# Patient Record
Sex: FEMALE | Race: WHITE | NOT HISPANIC OR LATINO | Employment: FULL TIME | URBAN - METROPOLITAN AREA
[De-identification: names, ages, dates, MRNs, and addresses within clinical notes are randomized per-mention and may not be internally consistent; named-entity substitution may affect disease eponyms.]

---

## 2017-01-18 ENCOUNTER — APPOINTMENT (OUTPATIENT)
Dept: LAB | Facility: HOSPITAL | Age: 52
End: 2017-01-18

## 2017-01-18 ENCOUNTER — OFFICE VISIT (OUTPATIENT)
Dept: NEUROLOGY | Facility: CLINIC | Age: 52
End: 2017-01-18

## 2017-01-18 VITALS
DIASTOLIC BLOOD PRESSURE: 82 MMHG | BODY MASS INDEX: 29.74 KG/M2 | WEIGHT: 174.2 LBS | OXYGEN SATURATION: 98 % | HEIGHT: 64 IN | HEART RATE: 78 BPM | SYSTOLIC BLOOD PRESSURE: 134 MMHG

## 2017-01-18 DIAGNOSIS — G43.C0 PERIODIC HEADACHE SYNDROME, NOT INTRACTABLE: ICD-10-CM

## 2017-01-18 DIAGNOSIS — G47.33 OSA (OBSTRUCTIVE SLEEP APNEA): Primary | ICD-10-CM

## 2017-01-18 DIAGNOSIS — R20.0 BILATERAL HAND NUMBNESS: ICD-10-CM

## 2017-01-18 LAB
FOLATE SERPL-MCNC: 6.44 NG/ML (ref 3.2–20)
HBA1C MFR BLD: 5 % (ref 4.8–5.6)
VIT B12 BLD-MCNC: 335 PG/ML (ref 211–911)

## 2017-01-18 PROCEDURE — 86800 THYROGLOBULIN ANTIBODY: CPT | Performed by: PSYCHIATRY & NEUROLOGY

## 2017-01-18 PROCEDURE — 99214 OFFICE O/P EST MOD 30 MIN: CPT | Performed by: PSYCHIATRY & NEUROLOGY

## 2017-01-18 PROCEDURE — 86617 LYME DISEASE ANTIBODY: CPT | Performed by: PSYCHIATRY & NEUROLOGY

## 2017-01-18 PROCEDURE — 86038 ANTINUCLEAR ANTIBODIES: CPT | Performed by: PSYCHIATRY & NEUROLOGY

## 2017-01-18 PROCEDURE — 82746 ASSAY OF FOLIC ACID SERUM: CPT | Performed by: PSYCHIATRY & NEUROLOGY

## 2017-01-18 PROCEDURE — 86376 MICROSOMAL ANTIBODY EACH: CPT | Performed by: PSYCHIATRY & NEUROLOGY

## 2017-01-18 PROCEDURE — 84445 ASSAY OF TSI GLOBULIN: CPT | Performed by: PSYCHIATRY & NEUROLOGY

## 2017-01-18 PROCEDURE — 83036 HEMOGLOBIN GLYCOSYLATED A1C: CPT | Performed by: PSYCHIATRY & NEUROLOGY

## 2017-01-18 PROCEDURE — 86334 IMMUNOFIX E-PHORESIS SERUM: CPT | Performed by: PSYCHIATRY & NEUROLOGY

## 2017-01-18 PROCEDURE — 36415 COLL VENOUS BLD VENIPUNCTURE: CPT | Performed by: PSYCHIATRY & NEUROLOGY

## 2017-01-18 PROCEDURE — 82607 VITAMIN B-12: CPT | Performed by: PSYCHIATRY & NEUROLOGY

## 2017-01-18 RX ORDER — VORTIOXETINE 10 MG/1
1 TABLET, FILM COATED ORAL DAILY
COMMUNITY
Start: 2016-10-25 | End: 2020-04-16 | Stop reason: ALTCHOICE

## 2017-01-18 RX ORDER — SUMATRIPTAN 100 MG/1
TABLET, FILM COATED ORAL
Qty: 12 TABLET | Refills: 11 | Status: SHIPPED | OUTPATIENT
Start: 2017-01-18 | End: 2018-02-11 | Stop reason: SDUPTHER

## 2017-01-18 RX ORDER — CLONAZEPAM 0.5 MG/1
1 TABLET ORAL DAILY PRN
COMMUNITY
Start: 2016-12-28 | End: 2020-06-15

## 2017-01-18 NOTE — MR AVS SNAPSHOT
Ginette Georges   1/18/2017 10:45 AM   Office Visit    Dept Phone:  [phone]   Encounter #:  [id number]    Provider:  Brown Cruz MD   Department:  [facility] NEUROLOGY                Your Full Care Plan              Today's Medication Changes          These changes are accurate as of: 1/18/17 11:32 AM.  If you have any questions, ask your nurse or doctor.               Stop taking medication(s)listed here:     FLUoxetine 20 MG capsule   Commonly known as:  PROzac   Stopped by:  Brown Cruz MD                      Your Updated Medication List          This list is accurate as of: 1/18/17 11:32 AM.  Always use your most recent med list.                clonazePAM 0.5 MG tablet   Commonly known as:  KlonoPIN       SUMAtriptan 100 MG tablet   Commonly known as:  IMITREX       topiramate 100 MG tablet   Commonly known as:  TOPAMAX       TRINTELLIX 10 MG tablet   Generic drug:  Vortioxetine HBr       TYLENOL ARTHRITIS EXT RELIEF PO               We Performed the Following     Antinuclear Antibody With Reflex [place]     Hemoglobin A1c     Immunofixation, Serum     Lyme Disease, Western Blot     Nerve Conduction Test     Thyroid Antibodies     Thyroid Stimulating Immunoglobulin     Vitamin B12 & Folate       You Were Diagnosed With        Codes Comments    ASHISH (obstructive sleep apnea)    -  Primary ICD-10-CM: G47.33  ICD-9-CM: 327.23     Periodic headache syndrome, not intractable     ICD-10-CM: G43.C0  ICD-9-CM: 346.20     Bilateral hand numbness     ICD-10-CM: R20.0  ICD-9-CM: 782.0       Instructions     None    Patient Instructions History      Upcoming Appointments     Visit Type Date Time Department    FOLLOW UP 1/18/2017 10:45 AM [facility] Signup     [facility] allows you to send messages to your doctor, view your test results, renew your prescriptions, schedule appointments, and more. To sign up, go to  [email or web address] and click on the Sign Up Now link in the New User? box. Enter your Locality Activation Code exactly as it appears below along with the last four digits of your Social Security Number and your Date of Birth () to complete the sign-up process. If you do not sign up before the expiration date, you must request a new code.    Locality Activation Code: [email or web address]  Expires: 2017 11:32 AM    If you have questions, you can email [email or web address] or call [phone] to talk to our Locality staff. Remember, Locality is NOT to be used for urgent needs. For medical emergencies, dial 911.               Other Info from Your Visit           Allergies     Penicillins        Reason for Visit     Migraine     Sleep Apnea           Vital Signs     Blood Pressure Pulse Height Weight Oxygen Saturation Body Mass Index    134/82 78 64\" (162.6 cm) 174 lb 3.2 oz (79 kg) 98% 29.9 kg/m2    Smoking Status                   Never Smoker           Problems and Diagnoses Noted     Bilateral hand numbness    Migraines    ASHISH (obstructive sleep apnea)      No Longer an Issue     Difficulty falling or staying asleep        "

## 2017-01-18 NOTE — LETTER
January 18, 2017     David Limon MD  [address] [place] [address] [place] [address]    Patient: Ginette Geroges   YOB: 1965   Date of Visit: 1/18/2017       Dear Dr. Braxton MD:    Thank you for referring Ginette Georges to me for evaluation. Below are the relevant portions of my assessment and plan of care.         Problems Addressed this Visit        Cardiovascular and Mediastinum    Headache, migraine    Relevant Medications    TRINTELLIX 10 MG tablet    clonazePAM (KlonoPIN) 0.5 MG tablet    Acetaminophen (TYLENOL ARTHRITIS EXT RELIEF PO)       Respiratory    ASHISH (obstructive sleep apnea) - Primary     Continue CPAP             Nervous and Auditory    Bilateral hand numbness     New onset of hand numbness, neck pain and arthralgias.    EMG/NCS of the arms  Inflammatory labs          Relevant Orders    Nerve Conduction Test    Antinuclear Antibody With Reflex [place]    Thyroid Antibodies    Thyroid Stimulating Immunoglobulin    Vitamin B12 & Folate    Immunofixation, Serum    Hemoglobin A1c    Lyme Disease, Western Blot                 If you have questions, please do not hesitate to call me. I look forward to following Ginette along with you.         Sincerely,        Brown Cruz MD        CC: No Recipients

## 2017-01-18 NOTE — PROGRESS NOTES
Subjective:     Patient ID: Ginette Georges is a 51 y.o. female.    History of Present Illness     52 yo woman with ASHISH, Insomnia and migraines returns in follow up.  Last visit on 1/5/16 continued CPAP, TPM and sumatriptan.     New sx of joint pain and fatigue starting in Sept/Oct 2016.  Increasing need to sleep.  Scheduled to Rheumatology NP in March 2017.     Notes radiating burning pain in back and hands are going numb.     Labs - ESR, CBC, CMP, SSA, dsDNA, RF - nl  TSH - 0.010  ASHISH    AHI 26.7 treated with CPAP 11 cm H20.  Uses on average 8 to 9 hours.  Replaced mask two weeks ago.  Feels rested when she awakens. Needs to nap in afternoon.     Migraines    2 to 3 a month with weather changes.  Aborts with Imitrex in 30 - 45 minutes.  Frontal pressure can be severe if not treated.  Assoc sx of nausea.   Tolerating TPM.     Insomnia    Resolved.    The following portions of the patient's history were reviewed and updated as appropriate: allergies, current medications, past medical history, past surgical history and problem list.    Review of Systems   Constitutional: Positive for fatigue.   Musculoskeletal: Positive for arthralgias and neck pain.   Neurological: Positive for numbness and headaches.   Psychiatric/Behavioral: Positive for sleep disturbance.        Objective:    Neurologic Exam     Mental Status   Oriented to person, place, and time.   Speech: speech is normal   Level of consciousness: alert  Knowledge: good and consistent with education.   Normal comprehension.     Cranial Nerves   Cranial nerves II through XII intact.     CN II   Visual fields full to confrontation.   Visual acuity: normal  Right visual field deficit: none  Left visual field deficit: none     CN III, IV, VI   Pupils are equal, round, and reactive to light.  Extraocular motions are normal.   Nystagmus: none   Diplopia: none  Ophthalmoparesis: none  Upgaze: normal  Downgaze: normal  Conjugate gaze: present    CN V   Facial  sensation intact.   Right corneal reflex: normal  Left corneal reflex: normal    CN VII   Right facial weakness: none  Left facial weakness: none    CN VIII   Hearing: intact    CN IX, X   Palate: symmetric  Right gag reflex: normal  Left gag reflex: normal    CN XI   Right sternocleidomastoid strength: normal  Left sternocleidomastoid strength: normal    CN XII   Tongue: not atrophic  Fasciculations: absent  Tongue deviation: none    Motor Exam   Muscle bulk: normal  Overall muscle tone: normal    Strength   Strength 5/5 throughout.     Sensory Exam   Right arm light touch: decreased from wrist  Left arm light touch: decreased from wrist  Right leg light touch: normal  Left leg light touch: normal  Vibration normal.   Proprioception normal.   Right arm pinprick: decreased from wrist  Left arm pinprick: decreased from wrist  Right leg pinprick: normal  Left leg pinprick: normal    Gait, Coordination, and Reflexes     Gait  Gait: normal    Tremor   Resting tremor: absent  Intention tremor: absent  Action tremor: absent    Reflexes   Reflexes 2+ except as noted.       Physical Exam   Constitutional: She is oriented to person, place, and time.   Eyes: EOM are normal. Pupils are equal, round, and reactive to light.   Neurological: She is oriented to person, place, and time. She has normal strength. Gait normal.   Psychiatric: Her speech is normal.       Assessment/Plan:       Problems Addressed this Visit        Cardiovascular and Mediastinum    Headache, migraine     Headaches are improving with treatment.  Continue current treatment regimen.             Relevant Medications    TRINTELLIX 10 MG tablet    clonazePAM (KlonoPIN) 0.5 MG tablet    Acetaminophen (TYLENOL ARTHRITIS EXT RELIEF PO)    SUMAtriptan (IMITREX) 100 MG tablet       Respiratory    ASHISH (obstructive sleep apnea) - Primary     Continue CPAP             Nervous and Auditory    Bilateral hand numbness     New onset of hand numbness, neck pain and  arthralgias.    EMG/NCS of the arms  Inflammatory labs          Relevant Orders    Nerve Conduction Test    Antinuclear Antibody With Reflex [place]    Thyroid Antibodies    Thyroid Stimulating Immunoglobulin    Vitamin B12 & Folate    Immunofixation, Serum    Hemoglobin A1c    Lyme Disease, Western Blot

## 2017-01-20 LAB
ANA SER QL: NEGATIVE
IGA SERPL-MCNC: 187 MG/DL (ref 87–352)
IGG SERPL-MCNC: 829 MG/DL (ref 700–1600)
IGM SERPL-MCNC: 144 MG/DL (ref 26–217)
Lab: NORMAL
PROT PATTERN SERPL IFE-IMP: NORMAL
THYROGLOB AB SERPL-ACNC: <1 IU/ML (ref 0–0.9)
THYROPEROXIDASE AB SERPL-ACNC: 10 IU/ML (ref 0–34)

## 2017-01-23 LAB — TSI ACT/NOR SER: 88 % (ref 0–139)

## 2017-01-24 LAB

## 2017-02-02 ENCOUNTER — OFFICE VISIT (OUTPATIENT)
Dept: NEUROLOGY | Facility: CLINIC | Age: 52
End: 2017-02-02

## 2017-02-02 VITALS
HEART RATE: 62 BPM | WEIGHT: 176.2 LBS | OXYGEN SATURATION: 98 % | HEIGHT: 64 IN | SYSTOLIC BLOOD PRESSURE: 118 MMHG | BODY MASS INDEX: 30.08 KG/M2 | DIASTOLIC BLOOD PRESSURE: 76 MMHG

## 2017-02-02 DIAGNOSIS — R20.0 BILATERAL HAND NUMBNESS: Primary | ICD-10-CM

## 2017-02-02 PROCEDURE — 95910 NRV CNDJ TEST 7-8 STUDIES: CPT | Performed by: PSYCHIATRY & NEUROLOGY

## 2017-02-02 PROCEDURE — 95886 MUSC TEST DONE W/N TEST COMP: CPT | Performed by: PSYCHIATRY & NEUROLOGY

## 2017-02-02 NOTE — PROGRESS NOTES
[facility] Neurology [place]   Electrodiagnostic Laboratory    Nerve Conduction & EMG Report        Patient:  Ginette Georges   Patient ID: [id number]   YOB: 1965  Sex:  female      Exam Physician: Brown Cruz MD     Electromyogram and Nerve Conduction Velocity Procedure Note    Hx: 51 y.o. right handed female with complaint of numbness involving the the upper extremities and pain involving the the upper extremities. Symptoms have been present for 4 months and were provoked by no clear event. Significant past medical history includes nothing suggestive of neuropathy.  Family history no family history of nerve or muscle disease.    Exam: Motor power is normal in UE. There is no atrophy. There are no fasciculations. Deep tendon reflexes are present and symmetrical. Sensory exam is normal.      Edx studies of the bilateral upper extremities were performed to evaluate for carpal tunnel syndrome and/or cervical radiculopathy    NCS Examination   For sensory nerve conduction studies, the amplitude is measured peak-to-peak, the latency reported is the distal peak latency, and the conduction velocity, if measured, is determined from onset latencies and is over the forearm.   For motor nerve conduction studies, the amplitude is measured baseline-to-peak, the latency reported is the distal onset latency, the conduction velocity is calculated over the forearm, and the F wave latency is the minimum latency.   Unless otherwise noted, the hand temperature was monitored continuously and remained between 32°C and 36°C during the performance of the NCSs.          Sensory NCS      Nerve / Sites Rec. Site Onset Lat Peak Lat NP Amp PP Amp Segments Distance Velocity     ms ms µV µV  cm m/s   L MEDIAN - Dig II Antidr      Wrist Dig II 3.10 3.85 23.4 45.2 Wrist - Dig II 14 45.2      Ref.   3.70 20.0  Ref.     R MEDIAN - Dig II Antidr      Wrist Dig II 3.30 4.10 19.1 38.5 Wrist - Dig II 14 42.4      Ref.   3.70 20.0   Ref.     L ULNAR - Dig V Antidr      Wrist Dig V 2.00 2.85 42.3 66.6 Wrist - Dig V 14 70.0      Ref.   3.70 10.0  Ref.     R ULNAR - Dig V Antidr      Wrist Dig V 2.15 3.30 26.0 65.2 Wrist - Dig V 14 65.1      Ref.   3.70 10.0  Ref.         Motor NCS      Nerve / Sites Rec. Site Lat Amp Seq Amp Segments Dist Velocity     ms mV %  cm m/s   L MEDIAN - APB      Wrist APB 3.10 4.9 100 Wrist - APB 8       Ref.  4.40 4.0  Ref.        Elbow APB 7.95 2.9 60.2 Elbow - Wrist 24 49.5      Ref.     Ref.  49.0   R MEDIAN - APB      Wrist APB 3.75 6.9 100 Wrist - APB 8       Ref.  4.40 4.0  Ref.        Elbow APB 6.95 8.4 121 Elbow - Wrist 24 75.0      Ref.     Ref.  49.0   L ULNAR - ADM      Wrist ADM 2.45 6.5 100 Wrist - ADM 8       Ref.  3.90 5.0  Ref.        B.Elbow ADM 5.95 4.9 74.7 B.Elbow - Wrist 23 65.7      Ref.     Ref.  49.0      A.Elbow ADM 7.50 5.0 103 A.Elbow - B.Elbow 9 58.1   R ULNAR - ADM      Wrist ADM 2.25 6.5 100 Wrist - ADM 8       Ref.  3.90 5.0  Ref.        B.Elbow ADM 6.00 6.3 97.9 B.Elbow - Wrist 24 64.0      Ref.     Ref.  49.0      A.Elbow ADM 8.45 4.9 77.7 A.Elbow - B.Elbow 11 44.9       F  Wave      Nerve Fmin    ms   L MEDIAN 27.25   REF 33.00   L ULNAR 27.05   REF 33.00   R MEDIAN 27.70   REF 33.00   R ULNAR 28.45   REF 33.00               EMG Examination   The study was performed with a concentric needle electrode. Fibrillation and fasciculation activity is graded from none (0) to continuous (4+). The configuration and recruitment pattern of motor unit action potentials under voluntary control, if not normal, are described bel      EMG Summary Table     Spontaneous MUAP Recruitment    IA Fib PSW Fasc H.F. Amp Dur. PPP Pattern   R. DELTOID N None None None None N N N N   R. BICEPS N None None None None N N N N   R. TRICEPS N None None None None N N N N   R. PRON TERES N None None None None N N N N   R. FIRST D INTEROSS N None None None None N N N N   L. FIRST D INTEROSS N None None None None N N N N    L. PRON TERES N None None None None N N N N   L. TRICEPS N None None None None N N N N   L. BICEPS N None None None None N N N N   L. DELTOID N None None None None N N N N         · Right median motor responses and F waves were normal.  · Right median sensory responses were normal  · Right ulnar motor responses and F wave were normal.  · Right ulnar sensory responses were normal  · Left median motor responses and F waves were normal.  · Left  median sensory responses were normal  · Left  ulnar motor responses and F wave were normal.  · Left  ulnar sensory responses were normal  · Needle examination with a concentric needle electrode of selected muscles of the bilateral upper extremities were normal         Conclusion: Normal NCV and EMG of the upper extremities          Instrument used:  Teca Synergy        Performed by:          Brown Cruz MD

## 2017-03-13 RX ORDER — TOPIRAMATE 100 MG/1
TABLET, FILM COATED ORAL
Qty: 360 TABLET | Refills: 2 | Status: SHIPPED | OUTPATIENT
Start: 2017-03-13 | End: 2017-12-06 | Stop reason: SDUPTHER

## 2017-12-06 RX ORDER — TOPIRAMATE 100 MG/1
TABLET, FILM COATED ORAL
Qty: 360 TABLET | Refills: 1 | Status: SHIPPED | OUTPATIENT
Start: 2017-12-06 | End: 2018-02-19 | Stop reason: SDUPTHER

## 2018-02-12 RX ORDER — SUMATRIPTAN 100 MG/1
TABLET, FILM COATED ORAL
Qty: 12 TABLET | Refills: 10 | Status: SHIPPED | OUTPATIENT
Start: 2018-02-12 | End: 2018-02-19 | Stop reason: SDUPTHER

## 2018-02-19 ENCOUNTER — OFFICE VISIT (OUTPATIENT)
Dept: NEUROLOGY | Facility: CLINIC | Age: 53
End: 2018-02-19

## 2018-02-19 VITALS
SYSTOLIC BLOOD PRESSURE: 114 MMHG | RESPIRATION RATE: 16 BRPM | HEIGHT: 64 IN | OXYGEN SATURATION: 100 % | HEART RATE: 76 BPM | DIASTOLIC BLOOD PRESSURE: 82 MMHG | WEIGHT: 173 LBS | BODY MASS INDEX: 29.53 KG/M2

## 2018-02-19 DIAGNOSIS — G47.33 OSA (OBSTRUCTIVE SLEEP APNEA): Primary | ICD-10-CM

## 2018-02-19 DIAGNOSIS — G43.C0 PERIODIC HEADACHE SYNDROME, NOT INTRACTABLE: ICD-10-CM

## 2018-02-19 PROCEDURE — 99213 OFFICE O/P EST LOW 20 MIN: CPT | Performed by: PSYCHIATRY & NEUROLOGY

## 2018-02-19 RX ORDER — SUMATRIPTAN 100 MG/1
TABLET, FILM COATED ORAL
Qty: 12 TABLET | Refills: 11 | Status: SHIPPED | OUTPATIENT
Start: 2018-02-19 | End: 2019-02-18 | Stop reason: SDUPTHER

## 2018-02-19 NOTE — PROGRESS NOTES
"Subjective:     Patient ID: Ginette Georges is a 52 y.o. female.  Chief Complaint   Patient presents with   • Sleep Apnea   • Bilateral hand numbness       History of Present Illness     52 y.o.  woman with ASHISH, Insomnia and migraines returns in follow up.  Last visit on 2/2/17 performed EMG/NCS of the B UE, normal, continued CPAP, TPM and sumatriptan.     New sx of joint pain and fatigue starting in Sept/Oct 2016.  Increasing need to sleep.  Scheduled to Rheumatology NP in March 2017.     Notes radiating burning pain in back and hands are going numb.     Labs - ESR, CBC, CMP, SSA, dsDNA, RF - nl  TSH - 0.010    ASHISH    Using CPAP on a daily basis.  Wearing 6 - 8 hours a day.  Needs rx for new mask and supplies.     AHI 26.7 treated with CPAP 11 cm H20.  Uses on average 8 to 9 hours.       Migraines    January 2018 had 4 HA days a week.  Increases stress at work.  Changed exercise routines and HA are decreasing.     HA aborts with Imitrex in 30 - 45 minutes, occasionally needs to repeat. .  Vertex pressure can be severe .  Assoc sx of nausea.   Tolerating TPM.       The following portions of the patient's history were reviewed and updated as appropriate: allergies, current medications, past medical history, past surgical history and problem list.    Review of Systems   Constitutional: Positive for fatigue.   Musculoskeletal: Positive for arthralgias and neck pain.   Neurological: Positive for numbness and headaches.   Psychiatric/Behavioral: Positive for sleep disturbance.        Objective:  Vitals:    02/19/18 0828   BP: 114/82   BP Location: Left arm   Patient Position: Sitting   Cuff Size: Adult   Pulse: 76   Resp: 16   SpO2: 100%   Weight: 78.5 kg (173 lb)   Height: 162.6 cm (64\")       Neurologic Exam     Mental Status   Oriented to person, place, and time.   Speech: speech is normal   Level of consciousness: alert  Knowledge: good and consistent with education.   Normal comprehension.     Cranial Nerves "   Cranial nerves II through XII intact.     CN II   Visual fields full to confrontation.   Visual acuity: normal  Right visual field deficit: none  Left visual field deficit: none     CN III, IV, VI   Pupils are equal, round, and reactive to light.  Extraocular motions are normal.   Nystagmus: none   Diplopia: none  Ophthalmoparesis: none  Upgaze: normal  Downgaze: normal  Conjugate gaze: present    CN V   Facial sensation intact.   Right corneal reflex: normal  Left corneal reflex: normal    CN VII   Right facial weakness: none  Left facial weakness: none    CN VIII   Hearing: intact    CN IX, X   Palate: symmetric  Right gag reflex: normal  Left gag reflex: normal    CN XI   Right sternocleidomastoid strength: normal  Left sternocleidomastoid strength: normal    CN XII   Tongue: not atrophic  Fasciculations: absent  Tongue deviation: none    Motor Exam   Muscle bulk: normal  Overall muscle tone: normal    Strength   Strength 5/5 throughout.     Sensory Exam   Right arm light touch: decreased from wrist  Left arm light touch: decreased from wrist  Right leg light touch: normal  Left leg light touch: normal  Vibration normal.   Proprioception normal.   Right arm pinprick: decreased from wrist  Left arm pinprick: decreased from wrist  Right leg pinprick: normal  Left leg pinprick: normal    Gait, Coordination, and Reflexes     Gait  Gait: normal    Tremor   Resting tremor: absent  Intention tremor: absent  Action tremor: absent    Reflexes   Reflexes 2+ except as noted.       Physical Exam   Constitutional: She is oriented to person, place, and time.   Eyes: EOM are normal. Pupils are equal, round, and reactive to light.   Neurological: She is oriented to person, place, and time. She has normal strength. Gait normal.   Psychiatric: Her speech is normal.       Assessment/Plan:       Problems Addressed this Visit        Cardiovascular and Mediastinum    Headache, migraine     Headaches are worsening.  Continue current  treatment regimen.             Relevant Medications    topiramate (TOPAMAX) 200 MG tablet    SUMAtriptan (IMITREX) 100 MG tablet       Respiratory    ASHISH (obstructive sleep apnea) - Primary     Moderate to severe ASHISH    Rx for new CPAP mask and supplies

## 2019-02-18 ENCOUNTER — OFFICE VISIT (OUTPATIENT)
Dept: NEUROLOGY | Facility: CLINIC | Age: 54
End: 2019-02-18

## 2019-02-18 VITALS
HEIGHT: 64 IN | HEART RATE: 78 BPM | SYSTOLIC BLOOD PRESSURE: 110 MMHG | RESPIRATION RATE: 16 BRPM | BODY MASS INDEX: 30.39 KG/M2 | DIASTOLIC BLOOD PRESSURE: 74 MMHG | WEIGHT: 178 LBS

## 2019-02-18 DIAGNOSIS — G47.33 OSA (OBSTRUCTIVE SLEEP APNEA): Primary | ICD-10-CM

## 2019-02-18 DIAGNOSIS — G43.C0 PERIODIC HEADACHE SYNDROME, NOT INTRACTABLE: ICD-10-CM

## 2019-02-18 PROCEDURE — 99213 OFFICE O/P EST LOW 20 MIN: CPT | Performed by: PSYCHIATRY & NEUROLOGY

## 2019-02-18 RX ORDER — SUMATRIPTAN 100 MG/1
100 TABLET, FILM COATED ORAL
Qty: 12 TABLET | Refills: 11 | Status: SHIPPED | OUTPATIENT
Start: 2019-02-18 | End: 2020-02-18

## 2019-02-18 NOTE — ASSESSMENT & PLAN NOTE
Headaches are worsening.  Continue current treatment regimen.     Continue Topamax 200 mg BID    Given information on Aimovig

## 2019-02-18 NOTE — PROGRESS NOTES
"Subjective:     Patient ID: Ginette Georges is a 53 y.o. female.  Chief Complaint   Patient presents with   • Sleep Apnea       History of Present Illness     53 y.o.  woman with ASHISH, Insomnia and migraines returns in follow up.  Last visit on 2/19/18  continued CPAP, TPM and sumatriptan.    ASHISH    Requesting new machine autopap 8 - 18 cm H20.      Compliant with CPAP and receiving benefit.     AHI 26.7 treated with CPAP 11 cm H20.      Migraines    Frequency is two a week.  Last for up to day.  Increased in last 60 - 90 days.      Starts in forehead and radiates to vertex pressure quality.  Severe intensity.  Can lose sight in right eye.       Preventatives:  TPM     The following portions of the patient's history were reviewed and updated as appropriate: allergies, current medications, past medical history, past surgical history and problem list.    Review of Systems   Psychiatric/Behavioral: Positive for sleep disturbance.        Objective:  Vitals:    02/18/19 0806   BP: 110/74   BP Location: Right arm   Patient Position: Sitting   Cuff Size: Adult   Pulse: 78   Resp: 16   Weight: 80.7 kg (178 lb)   Height: 162.6 cm (64\")       Neurologic Exam     Mental Status   Oriented to person, place, and time.   Speech: speech is normal   Level of consciousness: alert  Knowledge: good and consistent with education.   Normal comprehension.     Cranial Nerves   Cranial nerves II through XII intact.     CN II   Visual fields full to confrontation.   Visual acuity: normal  Right visual field deficit: none  Left visual field deficit: none     CN III, IV, VI   Pupils are equal, round, and reactive to light.  Extraocular motions are normal.   Nystagmus: none   Diplopia: none  Ophthalmoparesis: none  Upgaze: normal  Downgaze: normal  Conjugate gaze: present    CN V   Facial sensation intact.   Right corneal reflex: normal  Left corneal reflex: normal    CN VII   Right facial weakness: none  Left facial weakness: none    CN VIII "   Hearing: intact    CN IX, X   Palate: symmetric  Right gag reflex: normal  Left gag reflex: normal    CN XI   Right sternocleidomastoid strength: normal  Left sternocleidomastoid strength: normal    CN XII   Tongue: not atrophic  Fasciculations: absent  Tongue deviation: none    Motor Exam   Muscle bulk: normal  Overall muscle tone: normal    Strength   Strength 5/5 throughout.     Sensory Exam   Right arm light touch: decreased from wrist  Left arm light touch: decreased from wrist  Right leg light touch: normal  Left leg light touch: normal  Vibration normal.   Proprioception normal.   Right arm pinprick: decreased from wrist  Left arm pinprick: decreased from wrist  Right leg pinprick: normal  Left leg pinprick: normal    Gait, Coordination, and Reflexes     Gait  Gait: normal    Tremor   Resting tremor: absent  Intention tremor: absent  Action tremor: absent    Reflexes   Reflexes 2+ except as noted.       Physical Exam   Constitutional: She is oriented to person, place, and time.   Eyes: EOM are normal. Pupils are equal, round, and reactive to light.   Neurological: She is oriented to person, place, and time. She has normal strength. Gait normal.   Psychiatric: Her speech is normal.       Assessment/Plan:       Problems Addressed this Visit        Cardiovascular and Mediastinum    Headache, migraine     Headaches are worsening.  Continue current treatment regimen.     Continue Topamax 200 mg BID    Given information on Aimovig              Relevant Medications    topiramate (TOPAMAX) 200 MG tablet    SUMAtriptan (IMITREX) 100 MG tablet       Respiratory    ASHISH (obstructive sleep apnea) - Primary     Renew CPAP mask and supplies    New rx for autopap 8 - 18 cm H20

## 2020-02-28 ENCOUNTER — TELEPHONE (OUTPATIENT)
Dept: NEUROLOGY | Facility: CLINIC | Age: 55
End: 2020-02-28

## 2020-02-28 DIAGNOSIS — G43.C0 PERIODIC HEADACHE SYNDROME, NOT INTRACTABLE: Primary | ICD-10-CM

## 2020-03-02 RX ORDER — SUMATRIPTAN 100 MG/1
TABLET, FILM COATED ORAL
Qty: 12 TABLET | Refills: 5 | Status: SHIPPED | OUTPATIENT
Start: 2020-03-02 | End: 2020-04-16 | Stop reason: SDUPTHER

## 2020-04-16 ENCOUNTER — OFFICE VISIT (OUTPATIENT)
Dept: NEUROLOGY | Facility: CLINIC | Age: 55
End: 2020-04-16

## 2020-04-16 VITALS
HEART RATE: 82 BPM | DIASTOLIC BLOOD PRESSURE: 78 MMHG | BODY MASS INDEX: 31.07 KG/M2 | SYSTOLIC BLOOD PRESSURE: 127 MMHG | OXYGEN SATURATION: 98 % | HEIGHT: 64 IN | WEIGHT: 182 LBS

## 2020-04-16 DIAGNOSIS — G43.C0 PERIODIC HEADACHE SYNDROME, NOT INTRACTABLE: ICD-10-CM

## 2020-04-16 DIAGNOSIS — G47.33 OSA (OBSTRUCTIVE SLEEP APNEA): Primary | ICD-10-CM

## 2020-04-16 PROCEDURE — 99214 OFFICE O/P EST MOD 30 MIN: CPT | Performed by: PSYCHIATRY & NEUROLOGY

## 2020-04-16 RX ORDER — OMEPRAZOLE 40 MG/1
CAPSULE, DELAYED RELEASE ORAL
COMMUNITY
Start: 2020-04-12

## 2020-04-16 RX ORDER — ESTRADIOL 10 UG/1
INSERT VAGINAL
COMMUNITY
Start: 2020-04-12 | End: 2022-12-29

## 2020-04-16 RX ORDER — ESCITALOPRAM OXALATE 5 MG/1
TABLET ORAL
COMMUNITY
End: 2020-06-15 | Stop reason: DRUGHIGH

## 2020-04-16 RX ORDER — SUMATRIPTAN 100 MG/1
TABLET, FILM COATED ORAL
Qty: 12 TABLET | Refills: 11 | Status: SHIPPED | OUTPATIENT
Start: 2020-04-16 | End: 2021-07-13 | Stop reason: SDUPTHER

## 2020-04-16 RX ORDER — ALBUTEROL SULFATE 90 UG/1
AEROSOL, METERED RESPIRATORY (INHALATION)
COMMUNITY

## 2020-04-16 NOTE — PROGRESS NOTES
"Subjective:     Patient ID: Ginette Georges is a 54 y.o. female.  Chief Complaint   Patient presents with   • Obstructive Sleep Apnea     1yr follow up        History of Present Illness     54 y.o.  woman with ASHISH, Insomnia and migraines returns in follow up.  Last visit on 2/18/19 continued CPAP, TPM and sumatriptan.    ASHISH    Supplier is [employer]     Compliant with CPAP and receiving benefit.     Uses a travel machine on most nights.     AHI 7.3     AHI 26.7 treated with CPAP 11 cm H20.      Migraines    Frequency is two - three a week.  Last for up to  1-2 days. Imitrex helps but comes back the next day.        Starts in forehead and/or OD radiates to vertex pressure quality.  Severe intensity.  Can lose sight in right eye.       Preventatives:  TPM, TCAD, propranolol     The following portions of the patient's history were reviewed and updated as appropriate: allergies, current medications, past medical history, past surgical history and problem list.    Review of Systems   Psychiatric/Behavioral: Positive for sleep disturbance.        Objective:  Vitals:    04/16/20 1049   BP: 127/78   Pulse: 82   SpO2: 98%   Weight: 82.6 kg (182 lb)   Height: 162.6 cm (64\")       Neurologic Exam     Mental Status   Oriented to person, place, and time.   Speech: speech is normal   Level of consciousness: alert  Knowledge: good and consistent with education.   Normal comprehension.     Cranial Nerves   Cranial nerves II through XII intact.     CN II   Visual fields full to confrontation.   Visual acuity: normal  Right visual field deficit: none  Left visual field deficit: none     CN III, IV, VI   Pupils are equal, round, and reactive to light.  Extraocular motions are normal.   Nystagmus: none   Diplopia: none  Ophthalmoparesis: none  Upgaze: normal  Downgaze: normal  Conjugate gaze: present    CN V   Facial sensation intact.   Right corneal reflex: normal  Left corneal reflex: normal    CN VII   Right facial weakness: " none  Left facial weakness: none    CN VIII   Hearing: intact    CN IX, X   Palate: symmetric  Right gag reflex: normal  Left gag reflex: normal    CN XI   Right sternocleidomastoid strength: normal  Left sternocleidomastoid strength: normal    CN XII   Tongue: not atrophic  Fasciculations: absent  Tongue deviation: none    Motor Exam   Muscle bulk: normal  Overall muscle tone: normal    Strength   Strength 5/5 throughout.     Sensory Exam   Right arm light touch: decreased from wrist  Left arm light touch: decreased from wrist  Right leg light touch: normal  Left leg light touch: normal  Vibration normal.   Proprioception normal.   Right arm pinprick: decreased from wrist  Left arm pinprick: decreased from wrist  Right leg pinprick: normal  Left leg pinprick: normal    Gait, Coordination, and Reflexes     Gait  Gait: normal    Tremor   Resting tremor: absent  Intention tremor: absent  Action tremor: absent    Reflexes   Reflexes 2+ except as noted.       Physical Exam   Constitutional: She is oriented to person, place, and time.   Eyes: Pupils are equal, round, and reactive to light. EOM are normal.   Neurological: She is oriented to person, place, and time. She has normal strength. Gait normal.   Psychiatric: Her speech is normal.       Assessment/Plan:       Problems Addressed this Visit        Cardiovascular and Mediastinum    Headache, migraine     Headaches are worsening.  Medication changes per orders.     Add Aimovig 70 mg q4 weeks    First dosage given in office              Relevant Medications    escitalopram (Lexapro) 5 MG tablet    SUMAtriptan (IMITREX) 100 MG tablet    topiramate (TOPAMAX) 200 MG tablet       Respiratory    ASHISH (obstructive sleep apnea) - Primary     Continue CPAP

## 2020-04-16 NOTE — ASSESSMENT & PLAN NOTE
Headaches are worsening.  Medication changes per orders.     Add Aimovig 70 mg q4 weeks    First dosage given in office

## 2020-05-04 ENCOUNTER — TELEPHONE (OUTPATIENT)
Dept: NEUROLOGY | Facility: CLINIC | Age: 55
End: 2020-05-04

## 2020-05-04 NOTE — TELEPHONE ENCOUNTER
PATIENT CALLED TO REQUEST A REFILL OF HER AIMOVIG 70ML I DO NOT SEE IT ON HER MED LIST. PHARMACY  NEEDS A PRESCRIPTION SENT OVER. PT RECEIVED LETTER THAT PA WAS APPROVED    ANAHI LUCAS [facility]

## 2020-05-07 ENCOUNTER — SPECIALTY PHARMACY (OUTPATIENT)
Dept: ONCOLOGY | Facility: HOSPITAL | Age: 55
End: 2020-05-07

## 2020-06-04 ENCOUNTER — SPECIALTY PHARMACY (OUTPATIENT)
Dept: ONCOLOGY | Facility: HOSPITAL | Age: 55
End: 2020-06-04

## 2020-06-04 NOTE — PROGRESS NOTES
Injectable Neurology Medication Teaching        Patient Name/:     Ginette Georges    1965  Injectable Neurology Medication Regimen:  Aimovig 70mg SQ monthly  Date Started Medication: 2020               Initial Teaching Follow Up Comments      Safety      Storage instructions (away from children; away from heat/cold, sunlight, or moisture)       “How are you storing your medications?”, reminders on storage, proper handling (away from children, managing waste, etc.), disposal of medication with D/C or dosage change     Patient counseled on appropriate storage of medication. Store in refrigerator, away from pets and children. Advised to inspect each syringe prior to use and discard each syringe after use in an appropriate container. Pt verbalized understanding.       Adherence       patient and/or caregiver on how to take medication, take with/without food, assess their adherence potential, stress importance of adherence, ways to manage adherence (pill boxes, phone reminders, calendars), what to do if miss a dose    “How are you taking your medication?” “How are you remembering to take your medication?”, “How many doses have you missed?”, determine reasons for non-adherence (not remembering, side effects, etc), ways to improve, overadherence? Remind patient of ways to improve/maintain adherence Reviewed plan for Aimovig 70mg SQ monthly. Discussed importance of compliance. LD of Aimovig given in office on 2020. Next self-injection of Aimovig due 2020. Refill processed at [facility] retail and mailed to patient.     Side Effects/Adverse Reactions       patient on potential side effects, s/s, ways to manage, when to call MD/seek help       Determine if patient experiencing side effects, ways to manage  Discussed potential side effects including but not limited to: injection site reactions, constipation, and hypersensitivity reactions.  Counseled pt on importance of rotating injection sites.  Pt verbalized understanding.      Miscellaneous      Food interactions, DDIs, financial issues Determine if patient started any new medications (analyze for DDI) No DDIs identified with planned medication list and Aimovig.       Additional Notes: Discussed aforementioned material with patient by phone. All questions and concerns addressed. Patient provided with my contact information and instructed to call if any additional questions arise. Notified [facility] of refill request.

## 2020-06-15 ENCOUNTER — OFFICE VISIT (OUTPATIENT)
Dept: NEUROLOGY | Facility: CLINIC | Age: 55
End: 2020-06-15

## 2020-06-15 VITALS
WEIGHT: 185 LBS | OXYGEN SATURATION: 98 % | SYSTOLIC BLOOD PRESSURE: 118 MMHG | DIASTOLIC BLOOD PRESSURE: 80 MMHG | HEIGHT: 64 IN | BODY MASS INDEX: 31.58 KG/M2 | HEART RATE: 90 BPM

## 2020-06-15 DIAGNOSIS — G43.C0 PERIODIC HEADACHE SYNDROME, NOT INTRACTABLE: ICD-10-CM

## 2020-06-15 DIAGNOSIS — G47.33 OSA (OBSTRUCTIVE SLEEP APNEA): Primary | ICD-10-CM

## 2020-06-15 PROCEDURE — 99213 OFFICE O/P EST LOW 20 MIN: CPT | Performed by: PSYCHIATRY & NEUROLOGY

## 2020-06-15 RX ORDER — CHOLESTYRAMINE 4 G/5.5G
POWDER, FOR SUSPENSION ORAL
COMMUNITY
Start: 2020-06-08 | End: 2021-07-15

## 2020-06-15 RX ORDER — ESCITALOPRAM OXALATE 10 MG/1
TABLET ORAL
COMMUNITY
End: 2021-07-15

## 2020-06-15 NOTE — PROGRESS NOTES
"Subjective:     Patient ID: Ginette Georges is a 54 y.o. female.  Chief Complaint   Patient presents with   • Migraine     8 week follow up        History of Present Illness     54 y.o.  woman with ASHISH, Insomnia and migraines returns in follow up.  Last visit on 4/16/20 continued CPAP, TPM and sumatriptan, added Aimovig.    ASHISH    Supplier is [facility] [employer]    Uses nasal pillows.      Compliant with CPAP and receiving benefit.      Uses a travel machine on most nights.     AHI 7.3     AHI 26.7 treated with CPAP 11 cm H20.      Migraines    Three injections.    Frequency decreased two - three a month.  Last for up to 1 hours. Imitrex aborts in an hour.   .        Starts in forehead and/or OD radiates to vertex pressure quality.  Mild to moderate intensity.  Can lose sight in right eye.       Preventatives:  TPM, TCAD, propranolol     The following portions of the patient's history were reviewed and updated as appropriate: allergies, current medications, past medical history, past surgical history and problem list.    Review of Systems   Psychiatric/Behavioral: Positive for sleep disturbance.        Objective:  Vitals:    06/15/20 0840   BP: 118/80   Pulse: 90   SpO2: 98%   Weight: 83.9 kg (185 lb)   Height: 162.6 cm (64\")       Neurologic Exam     Mental Status   Oriented to person, place, and time.   Speech: speech is normal   Level of consciousness: alert  Knowledge: good and consistent with education.   Normal comprehension.     Cranial Nerves   Cranial nerves II through XII intact.     CN II   Visual fields full to confrontation.   Visual acuity: normal  Right visual field deficit: none  Left visual field deficit: none     CN III, IV, VI   Pupils are equal, round, and reactive to light.  Extraocular motions are normal.   Nystagmus: none   Diplopia: none  Ophthalmoparesis: none  Upgaze: normal  Downgaze: normal  Conjugate gaze: present    CN V   Facial sensation intact.   Right corneal reflex: normal  Left corneal " reflex: normal    CN VII   Right facial weakness: none  Left facial weakness: none    CN VIII   Hearing: intact    CN IX, X   Palate: symmetric  Right gag reflex: normal  Left gag reflex: normal    CN XI   Right sternocleidomastoid strength: normal  Left sternocleidomastoid strength: normal    CN XII   Tongue: not atrophic  Fasciculations: absent  Tongue deviation: none    Motor Exam   Muscle bulk: normal  Overall muscle tone: normal    Strength   Strength 5/5 throughout.     Sensory Exam   Right arm light touch: decreased from wrist  Left arm light touch: decreased from wrist  Right leg light touch: normal  Left leg light touch: normal  Vibration normal.   Proprioception normal.   Right arm pinprick: decreased from wrist  Left arm pinprick: decreased from wrist  Right leg pinprick: normal  Left leg pinprick: normal    Gait, Coordination, and Reflexes     Gait  Gait: normal    Tremor   Resting tremor: absent  Intention tremor: absent  Action tremor: absent    Reflexes   Reflexes 2+ except as noted.       Physical Exam   Constitutional: She is oriented to person, place, and time.   Eyes: Pupils are equal, round, and reactive to light. EOM are normal.   Neurological: She is oriented to person, place, and time. She has normal strength. Gait normal.   Psychiatric: Her speech is normal.       Assessment/Plan:       Problems Addressed this Visit        Cardiovascular and Mediastinum    Headache, migraine     Headaches are improving with treatment.  Continue current treatment regimen.             Relevant Medications    escitalopram (Lexapro) 10 MG tablet       Respiratory    ASHISH (obstructive sleep apnea) - Primary     Compliant with CPAP and receiving benefit.

## 2020-07-02 ENCOUNTER — SPECIALTY PHARMACY (OUTPATIENT)
Dept: ONCOLOGY | Facility: HOSPITAL | Age: 55
End: 2020-07-02

## 2020-07-02 NOTE — PROGRESS NOTES
Submitted new script for 90-day supply of Aimovig 70mg/mL SQ every 28 days per patients insurance plan.

## 2021-05-25 RX ORDER — ERENUMAB-AOOE 70 MG/ML
INJECTION SUBCUTANEOUS
Qty: 3 PEN | Refills: 3 | Status: SHIPPED | OUTPATIENT
Start: 2021-05-25 | End: 2021-07-02 | Stop reason: SDUPTHER

## 2021-07-02 ENCOUNTER — SPECIALTY PHARMACY (OUTPATIENT)
Dept: ONCOLOGY | Facility: HOSPITAL | Age: 56
End: 2021-07-02

## 2021-07-02 RX ORDER — ERENUMAB-AOOE 70 MG/ML
70 INJECTION SUBCUTANEOUS
Qty: 3 PEN | Refills: 3 | Status: SHIPPED | OUTPATIENT
Start: 2021-07-02 | End: 2021-08-05

## 2021-07-13 DIAGNOSIS — G43.C0 PERIODIC HEADACHE SYNDROME, NOT INTRACTABLE: ICD-10-CM

## 2021-07-13 RX ORDER — SUMATRIPTAN 100 MG/1
TABLET, FILM COATED ORAL
Qty: 12 TABLET | Refills: 11 | Status: SHIPPED | OUTPATIENT
Start: 2021-07-13 | End: 2022-05-05 | Stop reason: SDUPTHER

## 2021-07-13 NOTE — TELEPHONE ENCOUNTER
Sumatriptan  Follow up: 7/15/2021  Last filled:4/16/2020 with 11 refills    Patient called stating she is out of her Imitrex and has a bad headache today. She had to be rescheduled where  is out this week and will see Les this week. She is calling her specialty pharmacy next to find out why she has not received her Aimovig yet as it was sent in on 7/2/2021.

## 2021-07-15 ENCOUNTER — OFFICE VISIT (OUTPATIENT)
Dept: NEUROLOGY | Facility: CLINIC | Age: 56
End: 2021-07-15

## 2021-07-15 VITALS
DIASTOLIC BLOOD PRESSURE: 74 MMHG | HEIGHT: 64 IN | OXYGEN SATURATION: 97 % | BODY MASS INDEX: 33.12 KG/M2 | SYSTOLIC BLOOD PRESSURE: 122 MMHG | HEART RATE: 87 BPM | WEIGHT: 194 LBS

## 2021-07-15 DIAGNOSIS — G47.33 OSA (OBSTRUCTIVE SLEEP APNEA): ICD-10-CM

## 2021-07-15 DIAGNOSIS — R20.0 BILATERAL HAND NUMBNESS: ICD-10-CM

## 2021-07-15 DIAGNOSIS — G43.C0 PERIODIC HEADACHE SYNDROME, NOT INTRACTABLE: Primary | ICD-10-CM

## 2021-07-15 PROCEDURE — 99214 OFFICE O/P EST MOD 30 MIN: CPT | Performed by: NURSE PRACTITIONER

## 2021-07-15 RX ORDER — BUPROPION HYDROCHLORIDE 150 MG/1
150 TABLET ORAL DAILY
COMMUNITY
End: 2022-06-23

## 2021-07-15 RX ORDER — HYDROXYZINE PAMOATE 25 MG/1
CAPSULE ORAL AS NEEDED
COMMUNITY
Start: 2021-04-18 | End: 2022-06-23

## 2021-07-15 RX ORDER — BUPROPION HYDROCHLORIDE 300 MG/1
TABLET ORAL
COMMUNITY
Start: 2021-06-25 | End: 2021-07-15 | Stop reason: DRUGHIGH

## 2021-07-15 NOTE — PROGRESS NOTES
Subjective:     Patient ID: Ginette Georges is a 55 y.o. female.    CC:   Chief Complaint   Patient presents with   • Migraine       HPI:   History of Present IllnessPCP Dr Martin  Here to follow up HA, ASHISH, insomnia  Last visit 6/15/21 w Dr Cruz-Cont CPAP, TPM, imitrex, Aimovig, Lexapro    ASHISH  Wearing CPAP. Wearing nose pillows. Needs new rx. Cleaning tubing as directed daily. Replacing filters. Feels rested in am. Receiving benefit.    Migraines  Stopped TPM 6 weeks ago due to word finding. In meeting with clients and can't recall names.  Also with blurred vision, painful eyes. Last eye exam was 6 months ago with big change in Rx. Has been taking since 10/20/14.  Missed Aimovig dose.     Using Aimovig. Taking 12-15 imitrex/month. It is effective for only 10 days. Last injection 5/30/21.   Having 2-3  severeHA/week.  Pain in OD, loses vision, progresses to entire head. Having milder headaches 2-3 days a week.    Patient is having > 15 headaches per month, lasting 4-6 hours with moderate to severe intensity lasting over 6 months.    Preventatives: TPM, TCAD, Beta blockers, CGRP  Abortives: Sumatriptan, Tylenol, Excedrin      The following portions of the patient's history were reviewed and updated as appropriate: allergies, current medications, past family history, past medical history, past social history, past surgical history and problem list.      Current Outpatient Medications:   •  Acetaminophen (TYLENOL ARTHRITIS EXT RELIEF PO), Take 1 tablet by mouth Daily., Disp: , Rfl:   •  albuterol sulfate HFA (ProAir HFA) 108 (90 Base) MCG/ACT inhaler, ProAir HFA 90 mcg/actuation aerosol inhaler  INHALE ONE TO TWO PUFFS BY MOUTH EVERY 4 TO 6 HOURS AS NEEDED FOR SHORTNESS OF AIR, Disp: , Rfl:   •  buPROPion XL (WELLBUTRIN XL) 150 MG 24 hr tablet, Take 150 mg by mouth Daily., Disp: , Rfl:   •  Erenumab-aooe (Aimovig) 70 MG/ML prefilled syringe, Inject 1 mL under the skin into the appropriate area as directed Every  "28 (Twenty-Eight) Days., Disp: 3 pen, Rfl: 3  •  estradiol (VAGIFEM) 10 MCG tablet vaginal tablet, , Disp: , Rfl:   •  hydrOXYzine pamoate (VISTARIL) 25 MG capsule, As Needed., Disp: , Rfl:   •  linaclotide (LINZESS) 290 MCG capsule capsule, Linzess 290 mcg capsule  Take 1 capsule every day by oral route for 90 days., Disp: , Rfl:   •  omeprazole (priLOSEC) 40 MG capsule, , Disp: , Rfl:   •  SUMAtriptan (IMITREX) 100 MG tablet, Take one tablet at onset of headache. May repeat dose one time in 2 hours if headache not relieved., Disp: 12 tablet, Rfl: 11  •  topiramate (TOPAMAX) 200 MG tablet, Take 1 tablet by mouth 2 (Two) Times a Day., Disp: 180 tablet, Rfl: 2     Past Medical History:   Diagnosis Date   • Anemia    • Anxiety    • Arthritis    • Depression    • Migraine        Past Surgical History:   Procedure Laterality Date   • ANKLE ARTHROSCOPY     •  SECTION     • HYSTERECTOMY         Social History     Socioeconomic History   • Marital status:      Spouse name: Not on file   • Number of children: Not on file   • Years of education: Not on file   • Highest education level: Not on file   Tobacco Use   • Smoking status: Never Smoker   • Smokeless tobacco: Never Used   Vaping Use   • Vaping Use: Never used   Substance and Sexual Activity   • Alcohol use: Yes   • Drug use: Never   • Sexual activity: Defer       Family History   Problem Relation Age of Onset   • Alcohol abuse Father    • Hyperlipidemia Father    • Hypertension Father    • Alcohol abuse Brother    • Heart failure Maternal Grandmother    • Diabetes Maternal Grandmother    • Stroke Paternal Grandmother    • Cancer Paternal Grandmother         cervical, thyroid   • COPD Paternal Grandmother    • Arthritis Paternal Grandfather    • Down syndrome Son           Objective:  /74   Pulse 87   Ht 162.6 cm (64\")   Wt 88 kg (194 lb)   SpO2 97%   BMI 33.30 kg/m²     Neurologic Exam     Mental Status   Oriented to person, place, and time. "   Follows 3 step commands.   Attention: normal. Concentration: normal.   Speech: speech is normal   Level of consciousness: alert  Knowledge: consistent with education.   Normal comprehension.     Cranial Nerves     CN III, IV, VI   Pupils are equal, round, and reactive to light.  Right pupil: Accommodation: intact.   Left pupil: Accommodation: intact.   CN III: no CN III palsy  CN VI: no CN VI palsy  Nystagmus: none   Diplopia: none  Upgaze: normal  Downgaze: normal  Conjugate gaze: present    CN VIII   Hearing: intact    Motor Exam   Muscle bulk: normal  Overall muscle tone: normal    Strength   Right biceps: 5/5  Left biceps: 5/5  Right triceps: 5/5  Left triceps: 5/5  Right interossei: 5/5  Left interossei: 5/5  Right quadriceps: 5/5  Left quadriceps: 5/5  Right anterior tibial: 5/5  Left anterior tibial: 5/5  Right posterior tibial: 5/5  Left posterior tibial: 5/5    Sensory Exam   Light touch normal.     Gait, Coordination, and Reflexes     Gait  Gait: normal    Coordination   Romberg: negative  Finger to nose coordination: normal  Heel to shin coordination: normal    Tremor   Resting tremor: absent  Action tremor: absent    Reflexes   Right brachioradialis: 2+  Left brachioradialis: 2+  Right biceps: 2+  Left biceps: 2+  Right triceps: 2+  Left triceps: 2+  Right patellar: 2+  Left patellar: 2+  Right achilles: 2+  Left achilles: 2+  Right : 2+  Left : 2+      Physical Exam  Eyes:      Pupils: Pupils are equal, round, and reactive to light.   Neurological:      Mental Status: She is oriented to person, place, and time.      Coordination: Finger-Nose-Finger Test, Heel to Shin Test and Romberg Test normal.      Gait: Gait is intact.      Deep Tendon Reflexes:      Reflex Scores:       Tricep reflexes are 2+ on the right side and 2+ on the left side.       Bicep reflexes are 2+ on the right side and 2+ on the left side.       Brachioradialis reflexes are 2+ on the right side and 2+ on the left side.        Patellar reflexes are 2+ on the right side and 2+ on the left side.       Achilles reflexes are 2+ on the right side and 2+ on the left side.  Psychiatric:         Speech: Speech normal.            Assessment/Plan:       Diagnoses and all orders for this visit:    1. Periodic headache syndrome, not intractable (Primary)  Comments:  Pt stopped TPM. Discussed Botox and gave information but she wants to wait.  Will switch to Emgality.      2. ASHISH (obstructive sleep apnea)  Comments:  Rx written for new CPAP supplies.      3. Bilateral hand numbness  Comments:  Stop TPM.             Reviewed medications, potential side effects and signs and symptoms to report. Discussed risk versus benefits of treatment plan with patient and/or family-including medications, labs and radiology that may be ordered. Addressed questions and concerns during visit. Patient and/or family verbalized understanding and agree with plan. Instructed to call the office with any questions and report to ER with any life-threatening symptoms.    During this visit the following were done:  Labs Reviewed []    Labs Ordered []    Radiology Reports Reviewed []    Radiology Ordered []    PCP Records Reviewed []    Referring Provider Records Reviewed []    ER Records Reviewed []    Hospital Records Reviewed []    History Obtained From Family []    Radiology Images Reviewed []    Other Reviewed [x]    Records Requested []      Frankie Holman, ALIREZA, APRN  7/15/2021

## 2021-07-28 ENCOUNTER — TELEPHONE (OUTPATIENT)
Dept: NEUROLOGY | Facility: CLINIC | Age: 56
End: 2021-07-28

## 2021-07-28 NOTE — TELEPHONE ENCOUNTER
Provider: SEYMOUR SALAS    Caller: LENORA COSME    Relationship to Patient: SELF    Phone Number: [phone]    Reason for Call: THE PT CALLED IN STATING THE EMGALITY THAT WAS DISCUSSED AT HER LAST APPT WITH SEYMOUR WAS APPROVED BY HER INSURANCE. SHE IS NEEDING A RX SENT IN TO HER PHARMACY. THE PT ALSO SAID THAT HER CPAP MACHINE IS OVER 5 YEARS OLD AND HAS BEEN RECALLED. SHE CONTACTED HER DME COMPANY AND SHE SAID THEY TOLD HER TO HAVE THE OFFICE SEND IN AN ORDER FOR A NEW MACHINE ALONG WITH THE LAST OV NOTE AND SHE COULD GET A NEW MACHINE.

## 2021-08-05 ENCOUNTER — SPECIALTY PHARMACY (OUTPATIENT)
Dept: ONCOLOGY | Facility: HOSPITAL | Age: 56
End: 2021-08-05

## 2022-05-05 DIAGNOSIS — G43.C0 PERIODIC HEADACHE SYNDROME, NOT INTRACTABLE: ICD-10-CM

## 2022-05-05 RX ORDER — SUMATRIPTAN 100 MG/1
TABLET, FILM COATED ORAL
Qty: 36 TABLET | Refills: 3 | Status: SHIPPED | OUTPATIENT
Start: 2022-05-05

## 2022-05-05 NOTE — TELEPHONE ENCOUNTER
Rx Refill Note  Requested Prescriptions     Pending Prescriptions Disp Refills   • SUMAtriptan (IMITREX) 100 MG tablet 12 tablet 11     Sig: Take one tablet at onset of headache. May repeat dose one time in 2 hours if headache not relieved.      Last filled: 7/13/21 with 11 refills  Sent this in changing to 90 day supply since patient is asking for it to be filled @ mail order pharmacy. Thanks!   Last office visit with prescribing clinician: 7/15/2021      Next office visit with prescribing clinician: 6/23/2022     Safia Beck MA  05/05/22, 16:34 EDT

## 2022-05-05 NOTE — TELEPHONE ENCOUNTER
Provider: SEYMOUR SALAS   Caller: LENORA   Requested Prescriptions:   Requested Prescriptions     Pending Prescriptions Disp Refills   • SUMAtriptan (IMITREX) 100 MG tablet 12 tablet 11     Sig: Take one tablet at onset of headache. May repeat dose one time in 2 hours if headache not relieved.        Pharmacy where request should be sent: [facility] - [place], [place] - [address] AT PORTAL TO [facility] - [phone] [facility] [phone] FX     Additional details provided by patient: PT HAS ONE OR TWO TABLETS LEFT     Does the patient have less than a 3 day supply:  [x] Yes  [] No    Nancy Ledezma Rep   05/05/22 15:26 EDT         PT COMES IN ABOUT 2 WKS OUT OF THE MONTH

## 2022-06-23 ENCOUNTER — TELEMEDICINE (OUTPATIENT)
Dept: NEUROLOGY | Facility: CLINIC | Age: 57
End: 2022-06-23

## 2022-06-23 DIAGNOSIS — G43.C0 PERIODIC HEADACHE SYNDROME, NOT INTRACTABLE: Primary | ICD-10-CM

## 2022-06-23 DIAGNOSIS — G47.33 OSA (OBSTRUCTIVE SLEEP APNEA): ICD-10-CM

## 2022-06-23 PROCEDURE — 99213 OFFICE O/P EST LOW 20 MIN: CPT | Performed by: NURSE PRACTITIONER

## 2022-06-23 NOTE — PROGRESS NOTES
Headache New Office Visit      Encounter Date: 2022   Patient Name: Ginette Georges  : 1965   MRN: [id number]   PCP: Dr Martin  Chief Complaint:    Chief Complaint   Patient presents with   • Headache   • Migraine       You have chosen to receive care through a telehealth visit.  Do you consent to use a video/audio connection for your medical care today? Yes      History of Present Illness: Ginette Georges is a 56 y.o. female who is here today for evaluation of headaches and ASHISH.    Last visit 7/15/2021 w me-Stop TPM and switch to Emgality      Currently using Imitrex and Emgality    Headache Symptoms:   Days per month: 1-3/month  Location: Right Eye      Quality: Sharp and Aching        Duration: 4-6 hours  Severity: mod to severe  Triggers: weather changes  Associated Symptoms: Nausea and  Vision changes loss sight OD  Aura: none  Visual Changes: blurred    Preventatives: TPM-word finding difficulties, TCAD, Beta blockers, CGRP-Aimovig  Abortives: Sumatriptan, Tylenol, Excedrin, Imitrex       ASHISH  Wearing CPAP w nose pillows. Cleaning tubing as directed. Feels rested in am. Receiving benefit.  [facility]    AHI 7.3      AHI 26.7 treated with CPAP 11 cm H20.            Subjective      Past Medical History:   Past Medical History:   Diagnosis Date   • Anemia    • Anxiety    • Arthritis    • Depression    • Migraine        Past Surgical History:   Past Surgical History:   Procedure Laterality Date   • ANKLE ARTHROSCOPY     •  SECTION     • HYSTERECTOMY         Family History:   Family History   Problem Relation Age of Onset   • Alcohol abuse Father    • Hyperlipidemia Father    • Hypertension Father    • Alcohol abuse Brother    • Heart failure Maternal Grandmother    • Diabetes Maternal Grandmother    • Stroke Paternal Grandmother    • Cancer Paternal Grandmother         cervical, thyroid   • COPD Paternal Grandmother    • Arthritis Paternal Grandfather    • Down syndrome Son         Social History:   Social History     Socioeconomic History   • Marital status:    Tobacco Use   • Smoking status: Never Smoker   • Smokeless tobacco: Never Used   Vaping Use   • Vaping Use: Never used   Substance and Sexual Activity   • Alcohol use: Yes   • Drug use: Never   • Sexual activity: Defer       Medications:     Current Outpatient Medications:   •  galcanezumab-gnlm (EMGALITY) 120 MG/ML auto-injector pen, Inject 1 mL under the skin into the appropriate area as directed Every 28 (Twenty-Eight) Days., Disp: 3 mL, Rfl: 3  •  Acetaminophen (TYLENOL ARTHRITIS EXT RELIEF PO), Take 1 tablet by mouth Daily., Disp: , Rfl:   •  albuterol sulfate HFA (ProAir HFA) 108 (90 Base) MCG/ACT inhaler, ProAir HFA 90 mcg/actuation aerosol inhaler  INHALE ONE TO TWO PUFFS BY MOUTH EVERY 4 TO 6 HOURS AS NEEDED FOR SHORTNESS OF AIR, Disp: , Rfl:   •  estradiol (VAGIFEM) 10 MCG tablet vaginal tablet, , Disp: , Rfl:   •  linaclotide (LINZESS) 290 MCG capsule capsule, Linzess 290 mcg capsule  Take 1 capsule every day by oral route for 90 days., Disp: , Rfl:   •  omeprazole (priLOSEC) 40 MG capsule, , Disp: , Rfl:   •  SUMAtriptan (IMITREX) 100 MG tablet, Take one tablet at onset of headache. May repeat dose one time in 2 hours if headache not relieved., Disp: 36 tablet, Rfl: 3    Allergies:   Allergies   Allergen Reactions   • Malarone [Atovaquone-Proguanil Hcl] GI Intolerance   • Penicillins        PHQ-9 Total Score:     BELENADI Fall Risk Assessment has not been completed.    Objective     Physical Exam:   Physical Exam  Vitals and nursing note reviewed.   Constitutional:       General: She is not in acute distress.     Appearance: She is well-developed.   HENT:      Head: Normocephalic and atraumatic.      Right Ear: External ear normal.      Left Ear: External ear normal.      Nose: Nose normal.   Eyes:      General: No scleral icterus.        Right eye: No discharge.         Left eye: No discharge.       Conjunctiva/sclera: Conjunctivae normal.   Neurological:      General: No focal deficit present.      Mental Status: She is alert and oriented to person, place, and time. Mental status is at baseline.      Motor: No abnormal muscle tone.   Psychiatric:         Mood and Affect: Mood normal.         Behavior: Behavior normal.         Thought Content: Thought content normal.         Judgment: Judgment normal.         Neurologic Exam     Mental Status   Oriented to person, place, and time.        Vital Signs: There were no vitals filed for this visit.  There is no height or weight on file to calculate BMI.         Assessment / Plan      Assessment/Plan:   Diagnoses and all orders for this visit:    1. Periodic headache syndrome, not intractable (Primary)  Comments:  Cont Emgality and Imitrex  Orders:  -     galcanezumab-gnlm (EMGALITY) 120 MG/ML auto-injector pen; Inject 1 mL under the skin into the appropriate area as directed Every 28 (Twenty-Eight) Days.  Dispense: 3 mL; Refill: 3    2. ASHISH (obstructive sleep apnea)  Comments:  I called and requeted new order form for CPAP machine.           I called [facility]  1 [phone] and spoke to Ezekiel and she will fax over order for new machine.    Patient Education:   I have discussed with the patient today the causes and overview of headaches. We discussed the different types of headaches to include tension-type headaches, Migraine headaches and Cluster headaches. We also discussed when headaches could or would be of a more serious condition such as brain infection, inflammation or bleeding within or around the brain. When to seek immediate medical attention or call 911.     Reviewed medications, potential side effects and signs and symptoms to report. Discussed risk versus benefits of treatment plan with patient and/or family-including medications, labs and radiology that may be ordered. Addressed questions and concerns during visit. Patient and/or family verbalized  understanding and agree with plan. Instructed to call the office with any questions and report to ER with any life-threatening symptoms.     Follow Up:   Return in about 1 year (around 6/23/2023) for Recheck.    During this visit the following were done:  Labs Reviewed []    Labs Ordered []    Radiology Reports Reviewed [x]    Radiology Ordered []    PCP Records Reviewed []    Referring Provider Records Reviewed []    ER Records Reviewed []    Hospital Records Reviewed []    History Obtained From Family []    Radiology Images Reviewed []    Other Reviewed [x]    Records Requested []      Frankie Holman, DNP, APRN

## 2022-07-08 DIAGNOSIS — G43.C0 PERIODIC HEADACHE SYNDROME, NOT INTRACTABLE: ICD-10-CM

## 2022-07-08 RX ORDER — GALCANEZUMAB 120 MG/ML
INJECTION, SOLUTION SUBCUTANEOUS
Qty: 1 EACH | Refills: 2 | OUTPATIENT
Start: 2022-07-08

## 2022-07-08 NOTE — TELEPHONE ENCOUNTER
Rx Refill Note  Requested Prescriptions     Pending Prescriptions Disp Refills   • Emgality 120 MG/ML auto-injector pen [Pharmacy Med Name: EMGALITY PEN 120MG/ML] 1 each 2     Sig: INJECT 1 PEN UNDER THE SKIN 1 TIME A MONTH.      Last filled: 6/23/22 with 3 refills this should not be due.   Last office visit with prescribing clinician:  6/23/2022  Next office visit with prescribing clinician: Visit date not found     Safia Bekc MA  07/08/22, 14:19 EDT

## 2022-07-15 ENCOUNTER — DOCUMENTATION (OUTPATIENT)
Dept: ONCOLOGY | Facility: HOSPITAL | Age: 57
End: 2022-07-15

## 2022-07-15 ENCOUNTER — TELEPHONE (OUTPATIENT)
Dept: NEUROLOGY | Facility: CLINIC | Age: 57
End: 2022-07-15

## 2022-07-15 ENCOUNTER — SPECIALTY PHARMACY (OUTPATIENT)
Dept: ONCOLOGY | Facility: HOSPITAL | Age: 57
End: 2022-07-15

## 2022-07-15 DIAGNOSIS — G43.C0 PERIODIC HEADACHE SYNDROME, NOT INTRACTABLE: ICD-10-CM

## 2022-07-15 NOTE — TELEPHONE ENCOUNTER
Paperwork was faxed 06/29/2022.  Called patient and patient is going to call.    ----- Message from Frankie Holman DNP, APRN sent at 7/15/2022  1:03 PM EDT -----  Regarding: Please call and check on this  I thought we took care of this.    ----- Message -----  From: Jennifer Georges, Pharmacy Technician  Sent: 7/15/2022  10:52 AM EDT  To: Frankie Holman DNP, APRN    Patient was asking about a CPAP form that needed to be faxed. She said they had not called her and was checking to see if there was something she needed to do.          Yes

## 2022-07-15 NOTE — PROGRESS NOTES
Specialty Pharmacy Patient Management Program  Neurology Initial Assessment     Ginette Georges is a 56 y.o. female with migraines seen by a Neurology provider and enrolled in the Neurology Patient Management program offered by [facility].  An initial outreach was conducted, including assessment of therapy appropriateness and specialty medication education for Emgality. Patient has been on Emgality, but this is a first fill with [facility]. The patient was introduced to services offered by [facility], including: regular assessments, refill coordination, curbside pick-up or mail order delivery options, prior authorization maintenance, and financial assistance programs as applicable. The patient was also provided with contact information for the pharmacy team.     Insurance Coverage & Financial Support  [employer], Emgality co-pay card.    Relevant Past Medical History and Comorbidities  Relevant medical history and concomitant health conditions were discussed with the patient. The patient's chart has been reviewed for relevant past medical history and comorbid health conditions and updated as necessary.   Past Medical History:   Diagnosis Date   • Anemia    • Anxiety    • Arthritis    • Depression    • Migraine      Social History     Socioeconomic History   • Marital status:    Tobacco Use   • Smoking status: Never Smoker   • Smokeless tobacco: Never Used   Vaping Use   • Vaping Use: Never used   Substance and Sexual Activity   • Alcohol use: Yes   • Drug use: Never   • Sexual activity: Defer       Problem list reviewed by Joyce Clemens, PharmD on 7/15/2022 at 11:35 AM    Allergies  Known allergies and reactions were discussed with the patient. The patient's chart has been reviewed for  allergy information and updated as necessary.   Malarone [atovaquone-proguanil hcl] and Penicillins    Allergies reviewed by Joyce Clemens, PharmD on 7/15/2022 at 11:33  AM    Current Medication List  This medication list has been reviewed with the patient and evaluated for any interactions or necessary modifications/recommendations, and updated to include all prescription medications, OTC medications, and supplements the patient is currently taking.  This list reflects what is contained in the patient's profile, which has also been marked as reviewed to communicate to other providers it is the most up to date version of the patient's current medication therapy.     Current Outpatient Medications:   •  galcanezumab-gnlm (EMGALITY) 120 MG/ML auto-injector pen, Inject 1 mL under the skin into the appropriate area as directed Every 28 (Twenty-Eight) Days., Disp: 1 mL, Rfl: 11  •  Acetaminophen (TYLENOL ARTHRITIS EXT RELIEF PO), Take 1 tablet by mouth Daily., Disp: , Rfl:   •  albuterol sulfate HFA (ProAir HFA) 108 (90 Base) MCG/ACT inhaler, ProAir HFA 90 mcg/actuation aerosol inhaler  INHALE ONE TO TWO PUFFS BY MOUTH EVERY 4 TO 6 HOURS AS NEEDED FOR SHORTNESS OF AIR, Disp: , Rfl:   •  estradiol (VAGIFEM) 10 MCG tablet vaginal tablet, , Disp: , Rfl:   •  linaclotide (LINZESS) 290 MCG capsule capsule, Linzess 290 mcg capsule  Take 1 capsule every day by oral route for 90 days., Disp: , Rfl:   •  omeprazole (priLOSEC) 40 MG capsule, , Disp: , Rfl:   •  SUMAtriptan (IMITREX) 100 MG tablet, Take one tablet at onset of headache. May repeat dose one time in 2 hours if headache not relieved., Disp: 36 tablet, Rfl: 3    Medicines reviewed by Joyce Clemens, PharmD on 7/15/2022 at 11:35 AM    Drug Interactions  none     Recommended Medications Assessment    None      Relevant Laboratory Values        Initial Education Provided for Specialty Medication  The patient has been provided with the following education and any applicable administration techniques (i.e. self-injection) have been demonstrated for the therapies indicated. All questions and concerns have been addressed prior to the patient  receiving the medication, and the patient has verbalized understanding of the education and any materials provided.  Additional patient education shall be provided and documented upon request by the patient, provider or payer.                   Emgality (Galcanezumab-gnlm)           Medication Expectations   Why am I taking this medication? You are taking this medication for migraine prophylaxis.   What should I expect while on this medication? You should expect to a decrease in the frequency and severity of your migraines.   How does the medication work? Emgality is a monoclonal antibody that binds to calcitonin gene-related peptide (CGRP) and blocks its binding to the receptor decreasing the severity of migraines.   How long will I be on this medication for? The amount of time you will be on this medication will be determined by your doctor and your response to the medication.    How do I take this medication? Take as directed on your prescription label. This medication is a self-injection given every 28 days.    What are some possible side effects? Injection site reactions and hypersensitivity reactions.   What happens if I miss a dose? If you miss a dose, take it as soon as you remember, and time next dose 28 days from last dose.                  Medication Safety   What are things I should warn my doctor immediately about? Hypersensitivity reactions.   What are things that I should be cautious of? Injection site reaction.   What are some medications that can interact with this one? No drug interactions identified.            Medication Storage/Handling   How should I handle this medication? Keep this medication our of reach of pets/children in original container.  On the day your Emgality is due let it set at room temperature for 30 minutes prior to injection. (do NOT warm using a heat source such as hot water or a microwave).  Administer in the abdomen, thigh, back of the upper arm, or buttocks.  Do not inject  where the skin is tender, bruised, red or hard.  Rotate injection sites.   How does this medication need to be stored? Store in refrigerator and keep dry.   How should I dispose of this medication? You can dispose of the empty syringe in a sharps container, and if this is not available you may use an empty hard plastic container such as a milk jug or tide container.            Resources/Support   How can I remind myself to take this medication? You can download a reminder wale on your phone or use a calandar  to help with your monthly injection.   Is financial support available?  Yes, [employer] can provide co-pay cards if you have commercial insurance or patient assistance if you have Medicare or no insurance.    Which vaccines are recommended for me? Talk to your doctor about these vaccines: Flu, Coronavirus (COVID-19), Pneumococcal (pneumonia), Tdap, Hepatitis B, Zoster (shingles)                  Adherence and Self-Administration  • Barriers to Patient Adherence and/or Self-Administration: none    • Methods for Supporting Patient Adherence and/or Self-Administration: Patient adept with Emgality self-injections.     Goals of Therapy   Goals     • Specialty Pharmacy General Goal      Maintain adherence of greater than 80%.               Reassessment Plan & Follow-Up  1. Medication Therapy Changes: Continue Emgality 120 mg SubQ every 28 days.  2. Additional Plans, Therapy Recommendations, or Therapy Problems to Be Addressed: none   3. Pharmacist to perform regular reassessments no more than (6) months from the previous assessment.  4. Welcome information and patient satisfaction survey to be sent by retail team with patient's initial fill.  5. Care Coordinator to set up future refill outreaches, coordinate prescription delivery, and escalate clinical questions to pharmacist.     Attestation  I attest that the initiated specialty medication(s) are appropriate for the patient based on my assessment.  If the prescribed  therapy is at any point deemed not appropriate based on the current or future assessments, a consultation will be initiated with the patient's specialty care provider to determine the best course of action. The revised plan of therapy will be documented along with any additional patient education provided.     Joyce Clemens, PharmD  7/15/2022  11:36 EDT

## 2022-07-15 NOTE — PROGRESS NOTES
Specialty Pharmacy Refill Coordination Note     Ginette is a 56 y.o. female contacted today regarding refills of  Emgality specialty medication(s). Patient is due for injection on 8/12.      Reviewed and verified with patient:  Allergies  Meds  Problems         Specialty medication(s) and dose(s) confirmed: yes        Delivery Questions    Flowsheet Row Most Recent Value   Delivery method FedEx   Delivery address correct? Yes   Preferred delivery time? Anytime   Number of medications in delivery 1   Medication being filled and delivered Emgality   Doses left of specialty medications 0   Is there any medication that is due not being filled? No   Supplies needed? No supplies needed   Cooler needed? Yes   Do any medications need mixed or dated? No   Copay form of payment Payment plan already set up   Questions or concerns for the pharmacist? No   Are any medications first time fills? Yes            Medication Adherence    Demonstrates understanding of importance of adherence: yes  Informant: patient  Reliability of informant: reliable  Provider-estimated medication adherence level: %  Reasons for non-adherence: no problems identified  Adherence tools used: directed education  Support network for adherence: healthcare provider          Follow-up: 28 day(s)     Jennifer Georges, Pharmacy Technician  Specialty Pharmacy Technician

## 2022-08-05 ENCOUNTER — SPECIALTY PHARMACY (OUTPATIENT)
Dept: ONCOLOGY | Facility: HOSPITAL | Age: 57
End: 2022-08-05

## 2022-08-05 NOTE — PROGRESS NOTES
Specialty Pharmacy Refill Coordination Note     Ginette is a 56 y.o. female contacted today regarding refills of  Emgality specialty medication(s). Patient did not need refilled at this time. She has 1 in stock for her injection on 8/15. Patient requested a call back aroun d9/6.    Reviewed and verified with patient:       Specialty medication(s) and dose(s) confirmed: yes             Follow-up: Plan to reach out to patient around 9/6 for a refill.        Jennifer Georges, Pharmacy Technician  Specialty Pharmacy Technician

## 2022-09-06 ENCOUNTER — SPECIALTY PHARMACY (OUTPATIENT)
Dept: ONCOLOGY | Facility: HOSPITAL | Age: 57
End: 2022-09-06

## 2022-09-06 NOTE — PROGRESS NOTES
Specialty Pharmacy Refill Coordination Note     Ginette is a 56 y.o. female contacted today regarding refills of Emgality specialty medication(s). Patient's last injection of Emgality was given on 8/15/2022. Patient's next injection of Emgalty due 9/12/2022.    Reviewed and verified with patient: Yes  Specialty medication(s) and dose(s) confirmed: yes    Refill Questions    Flowsheet Row Most Recent Value   Changes to allergies? No   Changes to medications? No   New conditions since last clinic visit No   Unplanned office visit, urgent care, ED, or hospital admission in the last 4 weeks  No   How does patient/caregiver feel medication is working? Excellent   Financial problems or insurance changes  No   Since the previous refill, were any specialty medication doses or scheduled injections missed or delayed?  No   Does this patient require a clinical escalation to a pharmacist? No          Delivery Questions    Flowsheet Row Most Recent Value   Delivery method FedEx   Delivery address correct? Yes   Preferred delivery time? Anytime   Number of medications in delivery 1   Medication being filled and delivered Emgality   Doses left of specialty medications None. Once monthly injection. Patient used sample pen on 8/15.   Is there any medication that is due not being filled? No   Supplies needed? No supplies needed   Cooler needed? Yes   Do any medications need mixed or dated? No   Copay form of payment Credit card on file   Questions or concerns for the pharmacist? No   Are any medications first time fills? No            Medication Adherence    Adherence tools used: directed education  Support network for adherence: healthcare provider          Follow-up:  28 days.     Shani Copeland, Pharmacy Technician  Specialty Pharmacy Technician

## 2022-09-29 ENCOUNTER — SPECIALTY PHARMACY (OUTPATIENT)
Dept: ONCOLOGY | Facility: HOSPITAL | Age: 57
End: 2022-09-29

## 2022-09-29 NOTE — PROGRESS NOTES
Specialty Pharmacy Refill Coordination Note     Ginette is a 56 y.o. female contacted today regarding refills of Emgality specialty medication(s). Patient's last injection of Emgality was given on 9/12/2022. Patient's next injection of Emgality due 10/10/2022.    Reviewed and verified with patient: Yes  Specialty medication(s) and dose(s) confirmed: yes    Refill Questions    Flowsheet Row Most Recent Value   Changes to allergies? No   Changes to medications? No   New conditions since last clinic visit No   Unplanned office visit, urgent care, ED, or hospital admission in the last 4 weeks  No   How does patient/caregiver feel medication is working? Excellent   Financial problems or insurance changes  No   Since the previous refill, were any specialty medication doses or scheduled injections missed or delayed?  No   Does this patient require a clinical escalation to a pharmacist? No          Delivery Questions    Flowsheet Row Most Recent Value   Delivery method FedEx   Delivery address correct? Yes   Preferred delivery time? Anytime   Number of medications in delivery 1   Medication being filled and delivered Emgality   Doses left of specialty medications None. Once monthly injection.   Is there any medication that is due not being filled? No   Supplies needed? No supplies needed   Cooler needed? Yes   Do any medications need mixed or dated? No   Copay form of payment Credit card on file   Questions or concerns for the pharmacist? No   Are any medications first time fills? No            Medication Adherence    Adherence tools used: directed education  Support network for adherence: healthcare provider          Follow-up:  28 days     Shani Copeland, Pharmacy Technician  Specialty Pharmacy Technician

## 2022-10-28 ENCOUNTER — SPECIALTY PHARMACY (OUTPATIENT)
Dept: ONCOLOGY | Facility: HOSPITAL | Age: 57
End: 2022-10-28

## 2022-10-28 NOTE — PROGRESS NOTES
Specialty Pharmacy Refill Coordination Note     Ginette is a 56 y.o. female contacted today regarding refills of Emgality specialty medication(s). Patient's last injection of Emgality was given on 10/12/2022. Patient's next injection of Emgality due 11/9/2022.    Reviewed and verified with patient: Yes  Specialty medication(s) and dose(s) confirmed: yes    Refill Questions    Flowsheet Row Most Recent Value   Changes to allergies? No   Changes to medications? No   New conditions since last clinic visit No   Unplanned office visit, urgent care, ED, or hospital admission in the last 4 weeks  No   How does patient/caregiver feel medication is working? Excellent   Financial problems or insurance changes  No   If yes, describe changes in insurance or financial issues. N/A   Since the previous refill, were any specialty medication doses or scheduled injections missed or delayed?  No   Does this patient require a clinical escalation to a pharmacist? No          Delivery Questions    Flowsheet Row Most Recent Value   Delivery method FedEx   Delivery address correct? Yes   Preferred delivery time? Anytime   Number of medications in delivery 1   Medication being filled and delivered Emgality   Doses left of specialty medications None. Once monthly injection.   Is there any medication that is due not being filled? No   Supplies needed? No supplies needed   Cooler needed? Yes   Do any medications need mixed or dated? No   Copay form of payment Credit card on file   Questions or concerns for the pharmacist? No   Are any medications first time fills? No            Medication Adherence    Adherence tools used: directed education  Support network for adherence: healthcare provider          Follow-up:  28 days     Shani Copeland, Pharmacy Technician  Specialty Pharmacy Technician

## 2022-11-23 ENCOUNTER — SPECIALTY PHARMACY (OUTPATIENT)
Dept: ONCOLOGY | Facility: HOSPITAL | Age: 57
End: 2022-11-23

## 2022-11-23 NOTE — PROGRESS NOTES
Specialty Pharmacy Refill Coordination Note     Ginette is a 56 y.o. female contacted today regarding refills of  Emgality specialty medication(s). Patient is due for injection on 12/12.      Reviewed and verified with patient:       Specialty medication(s) and dose(s) confirmed: yes    Refill Questions    Flowsheet Row Most Recent Value   Changes to allergies? No   Changes to medications? No   New conditions since last clinic visit No   Unplanned office visit, urgent care, ED, or hospital admission in the last 4 weeks  No   How does patient/caregiver feel medication is working? Excellent   Financial problems or insurance changes  No   If yes, describe changes in insurance or financial issues. N/A   Since the previous refill, were any specialty medication doses or scheduled injections missed or delayed?  No   Does this patient require a clinical escalation to a pharmacist? No          Delivery Questions    Flowsheet Row Most Recent Value   Delivery method FedEx   Delivery address correct? Yes   Preferred delivery time? Anytime   Number of medications in delivery 1   Medication being filled and delivered Emgality   Doses left of specialty medications 0   Is there any medication that is due not being filled? No   Supplies needed? No supplies needed   Cooler needed? Yes   Do any medications need mixed or dated? No   Copay form of payment Payment plan already set up   Questions or concerns for the pharmacist? No   Are any medications first time fills? No            Medication Adherence    Adherence tools used: directed education  Support network for adherence: healthcare provider          Follow-up: 28 day(s)     Jennifer Georges, Pharmacy Technician  Specialty Pharmacy Technician

## 2022-12-29 ENCOUNTER — SPECIALTY PHARMACY (OUTPATIENT)
Dept: ONCOLOGY | Facility: HOSPITAL | Age: 57
End: 2022-12-29

## 2022-12-29 NOTE — PROGRESS NOTES
Specialty Pharmacy Refill Coordination Note     Ginette is a 57 y.o. female contacted today regarding refills of  Emgality specialty medication(s).    Reviewed and verified with patient:  Allergies  Meds  Problems       Specialty medication(s) and dose(s) confirmed: yes    Refill Questions    Flowsheet Row Most Recent Value   Changes to allergies? No   Changes to medications? No   New conditions since last clinic visit No   Unplanned office visit, urgent care, ED, or hospital admission in the last 4 weeks  No   How does patient/caregiver feel medication is working? Excellent   Financial problems or insurance changes  No   If yes, describe changes in insurance or financial issues. N/A   Since the previous refill, were any specialty medication doses or scheduled injections missed or delayed?  No   Does this patient require a clinical escalation to a pharmacist? No          Delivery Questions    Flowsheet Row Most Recent Value   Delivery method FedEx   Delivery address correct? Yes   Preferred delivery time? Anytime   Number of medications in delivery 1   Medication being filled and delivered Emgality   Doses left of specialty medications 0   Is there any medication that is due not being filled? No   Supplies needed? No supplies needed   Cooler needed? Yes   Do any medications need mixed or dated? No   Copay form of payment Payment plan already set up   Questions or concerns for the pharmacist? No   Are any medications first time fills? No            Medication Adherence    Adherence tools used: directed education  Support network for adherence: healthcare provider          Follow-up: 28 day(s)     Joyce Clemens, PharmD  12/29/2022

## 2022-12-29 NOTE — PROGRESS NOTES
Specialty Pharmacy Patient Management Program  Neurology Reassessment     Ginette Georges is a 57 y.o. female with migraines seen by a Neurology provider and enrolled in the Neurology Patient Management program offered by [facility].  A follow-up outreach was conducted, including assessment of continued therapy appropriateness, medication adherence, and side effect incidence and management for  Emgality.     Changes to Insurance Coverage or Financial Support     [employer], Emgality co-pay card.    Relevant Past Medical History and Comorbidities  Relevant medical history and concomitant health conditions were discussed with the patient. The patient's chart has been reviewed for relevant past medical history and comorbid health conditions and updated as necessary.   Past Medical History:   Diagnosis Date   • Anemia    • Anxiety    • Arthritis    • Depression    • Migraine      Social History     Socioeconomic History   • Marital status:    Tobacco Use   • Smoking status: Never   • Smokeless tobacco: Never   Vaping Use   • Vaping Use: Never used   Substance and Sexual Activity   • Alcohol use: Yes   • Drug use: Never   • Sexual activity: Defer       Problem list reviewed by Joyce Clemens, PharmCABRERA on 12/29/2022 at  2:31 PM    Allergies  Known allergies and reactions were discussed with the patient. The patient's chart has been reviewed for allergy information and updated as necessary.   Malarone [atovaquone-proguanil hcl] and Penicillins    Allergies reviewed by Joyce Clemens, PharmD on 12/29/2022 at  2:31 PM    Relevant Laboratory Values          Current Medication List  This medication list has been reviewed with the patient and evaluated for any interactions or necessary modifications/recommendations, and updated to include all prescription medications, OTC medications, and supplements the patient is currently taking.  This list reflects what is contained in the patient's  profile, which has also been marked as reviewed to communicate to other providers it is the most up to date version of the patient's current medication therapy.     Current Outpatient Medications:   •  Acetaminophen (TYLENOL ARTHRITIS EXT RELIEF PO), Take 1 tablet by mouth Daily., Disp: , Rfl:   •  albuterol sulfate  (90 Base) MCG/ACT inhaler, ProAir HFA 90 mcg/actuation aerosol inhaler  INHALE ONE TO TWO PUFFS BY MOUTH EVERY 4 TO 6 HOURS AS NEEDED FOR SHORTNESS OF AIR, Disp: , Rfl:   •  galcanezumab-gnlm (EMGALITY) 120 MG/ML auto-injector pen, Inject 1 mL under the skin into the appropriate area as directed Every 28 (Twenty-Eight) Days., Disp: 1 mL, Rfl: 11  •  linaclotide (LINZESS) 290 MCG capsule capsule, Linzess 290 mcg capsule  Take 1 capsule every day by oral route for 90 days., Disp: , Rfl:   •  omeprazole (priLOSEC) 40 MG capsule, , Disp: , Rfl:   •  SUMAtriptan (IMITREX) 100 MG tablet, Take one tablet at onset of headache. May repeat dose one time in 2 hours if headache not relieved., Disp: 36 tablet, Rfl: 3    Medicines reviewed by Joyce Clemens, PharmD on 12/29/2022 at  2:30 PM    Drug Interactions  none     Adverse Drug Reactions  • Adverse Reactions Experienced: none   • Plan for ADR Management: not required    Hospitalizations and Urgent Care Since Last Assessment  • Hospitalizations or Admissions: none   • ED Visits: none  • Urgent Office Visits: none     Adherence and Self-Administration  Adherence related patient's specialty therapy was discussed with the patient. The Adherence segment of this outreach has been reviewed and updated.     Medication Adherence    Adherence tools used: directed education  Support network for adherence: healthcare provider        • Ongoing or New Barriers to Patient Adherence and/or Self-Administration: none   • Methods for Supporting Patient Adherence and/or Self-Administration: Patient adept with Emgality self-injections.     Goals of Therapy  Goals related to  the patient's specialty therapy was discussed with the patient. The Patient Goals segment of this outreach has been reviewed and updated.    Goals     • Specialty Pharmacy General Goal      Maintain adherence of greater than 80%.             Quality of Life Assessment   Quality of Life related to the patient's specialty therapy was discussed with the patient. The QOL segment of this outreach has been reviewed and updated.     Quality of Life Assessment  Quality of Life Improvement Scale: A good deal better    Reassessment Plan & Follow-Up  1. Medication Therapy Changes: none  2. Additional Plans, Therapy Recommendations, or Therapy Problems to Be Addressed: none  3. Pharmacist to perform regular reassessments no more than (6) months from the previous assessment.  4. Care Coordinator to set up future refill outreaches, coordinate prescription delivery, and escalate clinical questions to pharmacist.     Attestation  I attest that the specialty medication(s) addressed above are appropriate for the patient based on my reassessment.  If the prescribed therapy is at any point deemed not appropriate based on the current or future assessments, a consultation will be initiated with the patient's specialty care provider to determine the best course of action. The revised plan of therapy will be documented along with any additional patient education provided.     Joyce Clemens, Glendy  12/29/2022  14:32 EST

## 2023-02-07 ENCOUNTER — SPECIALTY PHARMACY (OUTPATIENT)
Dept: ONCOLOGY | Facility: HOSPITAL | Age: 58
End: 2023-02-07
Payer: COMMERCIAL

## 2023-02-07 NOTE — PROGRESS NOTES
Specialty Pharmacy Refill Coordination Note     Ginette is a 57 y.o. female contacted today regarding refills of  Emgality specialty medication(s). Patient is due for injection on 2/12.      Reviewed and verified with patient:       Specialty medication(s) and dose(s) confirmed: yes    Refill Questions    Flowsheet Row Most Recent Value   Changes to allergies? No   Changes to medications? No   New conditions since last clinic visit No   Unplanned office visit, urgent care, ED, or hospital admission in the last 4 weeks  No   How does patient/caregiver feel medication is working? Excellent   Financial problems or insurance changes  No   If yes, describe changes in insurance or financial issues. N/A   Since the previous refill, were any specialty medication doses or scheduled injections missed or delayed?  No   Does this patient require a clinical escalation to a pharmacist? No          Delivery Questions    Flowsheet Row Most Recent Value   Delivery method FedEx   Delivery address correct? Yes   Preferred delivery time? Anytime   Number of medications in delivery 1   Medication being filled and delivered Emgality   Doses left of specialty medications 0   Is there any medication that is due not being filled? No   Supplies needed? No supplies needed   Cooler needed? Yes   Do any medications need mixed or dated? No   Copay form of payment Payment plan already set up   Questions or concerns for the pharmacist? No   Are any medications first time fills? No            Medication Adherence    Adherence tools used: directed education  Support network for adherence: healthcare provider          Follow-up: 28 day(s)     Jennifer Georges, Pharmacy Technician  Specialty Pharmacy Technician

## 2023-03-02 ENCOUNTER — SPECIALTY PHARMACY (OUTPATIENT)
Dept: ONCOLOGY | Facility: HOSPITAL | Age: 58
End: 2023-03-02
Payer: COMMERCIAL

## 2023-03-02 NOTE — PROGRESS NOTES
Specialty Pharmacy Refill Coordination Note     Ginette is a 57 y.o. female contacted today regarding refills of  Emgality specialty medication(s). Patient is due for injection on 3/15.      Reviewed and verified with patient:       Specialty medication(s) and dose(s) confirmed: yes    Refill Questions    Flowsheet Row Most Recent Value   Changes to allergies? No   Changes to medications? No   New conditions since last clinic visit No   Unplanned office visit, urgent care, ED, or hospital admission in the last 4 weeks  No   How does patient/caregiver feel medication is working? Excellent   Financial problems or insurance changes  No   If yes, describe changes in insurance or financial issues. N/A   Since the previous refill, were any specialty medication doses or scheduled injections missed or delayed?  No   Does this patient require a clinical escalation to a pharmacist? No          Delivery Questions    Flowsheet Row Most Recent Value   Delivery method FedEx   Delivery address correct? Yes   Preferred delivery time? Anytime   Number of medications in delivery 1   Medication being filled and delivered Emgality   Doses left of specialty medications 0   Is there any medication that is due not being filled? No   Supplies needed? No supplies needed   Cooler needed? Yes   Do any medications need mixed or dated? No   Copay form of payment Payment plan already set up   Questions or concerns for the pharmacist? No   Are any medications first time fills? No            Medication Adherence    Adherence tools used: directed education  Support network for adherence: healthcare provider          Follow-up: 28 day(s)     Jennifer Georges, Pharmacy Technician  Specialty Pharmacy Technician

## 2023-04-10 ENCOUNTER — SPECIALTY PHARMACY (OUTPATIENT)
Dept: ONCOLOGY | Facility: HOSPITAL | Age: 58
End: 2023-04-10
Payer: COMMERCIAL

## 2023-04-10 NOTE — PROGRESS NOTES
Specialty Pharmacy Refill Coordination Note     Ginette is a 57 y.o. female contacted today regarding refills of  Emgality specialty medication(s). Patient is due for injection on 4/12.      Reviewed and verified with patient:       Specialty medication(s) and dose(s) confirmed: yes    Refill Questions    Flowsheet Row Most Recent Value   Changes to allergies? No   Changes to medications? No   New conditions since last clinic visit No   Unplanned office visit, urgent care, ED, or hospital admission in the last 4 weeks  No   How does patient/caregiver feel medication is working? Excellent   Financial problems or insurance changes  No   If yes, describe changes in insurance or financial issues. N/A   Since the previous refill, were any specialty medication doses or scheduled injections missed or delayed?  No   Does this patient require a clinical escalation to a pharmacist? No          Delivery Questions    Flowsheet Row Most Recent Value   Delivery method FedEx   Delivery address correct? Yes   Preferred delivery time? Anytime   Number of medications in delivery 1   Medication being filled and delivered [facility]gality   Doses left of specialty medications 0   Is there any medication that is due not being filled? No   Supplies needed? No supplies needed   Cooler needed? Yes   Do any medications need mixed or dated? No   Copay form of payment Payment plan already set up   Questions or concerns for the pharmacist? No   Are any medications first time fills? No            Medication Adherence    Adherence tools used: directed education  Support network for adherence: healthcare provider          Follow-up: 28 day(s)     Jennifer Georges, Pharmacy Technician  Specialty Pharmacy Technician

## 2023-04-17 DIAGNOSIS — G43.C0 PERIODIC HEADACHE SYNDROME, NOT INTRACTABLE: ICD-10-CM

## 2023-04-17 RX ORDER — SUMATRIPTAN 100 MG/1
TABLET, FILM COATED ORAL
Qty: 30 TABLET | Refills: 0 | Status: SHIPPED | OUTPATIENT
Start: 2023-04-17

## 2023-04-17 NOTE — TELEPHONE ENCOUNTER
Rx Refill Note  Requested Prescriptions     Pending Prescriptions Disp Refills   • SUMAtriptan (IMITREX) 100 MG tablet [Pharmacy Med Name: SUMATRIPTAN  TAB 100MG] 36 tablet 3     Sig: TAKE 1 TABLET AT ONSET OF  HEADACHE. MAY REPEAT DOSE  ONE TIME IN 2 HOURS IF     HEADACHE NOT RELIEVED      Last filled: 5/5/22 36 with 3 refills.  Last office visit with prescribing clinician: 6/23/2022       Next office visit with prescribing clinician: Visit date not found     Sent in 30 with 0 refills.  Patient needs appointment for further refills.    Cecilia Romero MA  04/17/23, 09:04 EDT

## 2023-05-04 ENCOUNTER — SPECIALTY PHARMACY (OUTPATIENT)
Dept: ONCOLOGY | Facility: HOSPITAL | Age: 58
End: 2023-05-04
Payer: COMMERCIAL

## 2023-05-04 DIAGNOSIS — G43.C0 PERIODIC HEADACHE SYNDROME, NOT INTRACTABLE: ICD-10-CM

## 2023-05-04 RX ORDER — SUMATRIPTAN 100 MG/1
TABLET, FILM COATED ORAL
Qty: 30 TABLET | Refills: 1 | Status: SHIPPED | OUTPATIENT
Start: 2023-05-04

## 2023-05-04 NOTE — TELEPHONE ENCOUNTER
Rx Refill Note  Requested Prescriptions     Pending Prescriptions Disp Refills   • SUMAtriptan (IMITREX) 100 MG tablet 30 tablet 0     Sig: TAKE 1 TABLET AT ONSET OF  HEADACHE. MAY REPEAT DOSE  ONE TIME IN 2 HOURS IF     HEADACHE NOT RELIEVED      Last filled: 4/17/23 with 0 sent in with additional refills to get to fup appt in Sept.  Last office visit with prescribing clinician: 6/23/22     Next office visit with prescribing clinician: 9/19/2023     Safia Beck MA  05/04/23, 13:28 EDT

## 2023-05-04 NOTE — PROGRESS NOTES
Specialty Pharmacy Refill Coordination Note     Ginette is a 57 y.o. female contacted today regarding refills of  Emgality specialty medication(s).    Next Emgality dose due on 5/12/23.    Reviewed and verified with patient:       Specialty medication(s) and dose(s) confirmed: yes    Refill Questions    Flowsheet Row Most Recent Value   Changes to allergies? No   Changes to medications? No   New conditions since last clinic visit No   Unplanned office visit, urgent care, ED, or hospital admission in the last 4 weeks  No   How does patient/caregiver feel medication is working? Excellent   Financial problems or insurance changes  No   If yes, describe changes in insurance or financial issues. N/A   Since the previous refill, were any specialty medication doses or scheduled injections missed or delayed?  No   Does this patient require a clinical escalation to a pharmacist? No          Delivery Questions    Flowsheet Row Most Recent Value   Delivery method FedEx   Delivery address correct? Yes   Preferred delivery time? Anytime   Number of medications in delivery 1   Medication being filled and delivered Emgality   Doses left of specialty medications 0   Is there any medication that is due not being filled? No   Supplies needed? No supplies needed   Cooler needed? Yes   Do any medications need mixed or dated? No   Copay form of payment Payment plan already set up   Questions or concerns for the pharmacist? No   Are any medications first time fills? No            Medication Adherence    Adherence tools used: directed education  Support network for adherence: healthcare provider          Follow-up: 28  day(s)     Joyce Clemens, JannethD

## 2023-05-04 NOTE — TELEPHONE ENCOUNTER
Caller:  LENORA COSME    Relationship: PATIENT     Best call back number: [phone]    Requested Prescriptions:   Requested Prescriptions     Pending Prescriptions Disp Refills   • SUMAtriptan (IMITREX) 100 MG tablet 30 tablet 0     Sig: TAKE 1 TABLET AT ONSET OF  HEADACHE. MAY REPEAT DOSE  ONE TIME IN 2 HOURS IF     HEADACHE NOT RELIEVED        Pharmacy where request should be sent: [facility] - [place] - [address] [facility] AT [address] TO [facility] - [phone] [facility] [phone]      Last office visit with prescribing clinician: Visit date not found   Last telemedicine visit with prescribing clinician: 9/19/2023   Next office visit with prescribing clinician: 9/19/2023     Additional details provided by patient: PATIENT HAS 1 YEAR F/U SCHEDULED     Does the patient have less than a 3 day supply:  [] Yes  [x] No    Would you like a call back once the refill request has been completed: [] Yes [x] No    If the office needs to give you a call back, can they leave a voicemail: [] Yes [x] No    Nancy Anderson Rep   05/04/23 13:14 EDT

## 2023-06-07 ENCOUNTER — SPECIALTY PHARMACY (OUTPATIENT)
Dept: ONCOLOGY | Facility: HOSPITAL | Age: 58
End: 2023-06-07
Payer: COMMERCIAL

## 2023-06-07 NOTE — PROGRESS NOTES
Specialty Pharmacy Patient Management Program  Neurology Reassessment     Ginette Georges is a 57 y.o. female with migraines seen by a Neurology provider and enrolled in the Neurology Patient Management program offered by [facility].  A follow-up outreach was conducted, including assessment of continued therapy appropriateness, medication adherence, and side effect incidence and management for  Emgality.     Changes to Insurance Coverage or Financial Support  [employer], Emgality co-pay card     Relevant Past Medical History and Comorbidities  Relevant medical history and concomitant health conditions were discussed with the patient. The patient's chart has been reviewed for relevant past medical history and comorbid health conditions and updated as necessary.   Past Medical History:   Diagnosis Date    Anemia     Anxiety     Arthritis     Depression     Migraine      Social History     Socioeconomic History    Marital status:    Tobacco Use    Smoking status: Never    Smokeless tobacco: Never   Vaping Use    Vaping Use: Never used   Substance and Sexual Activity    Alcohol use: Yes    Drug use: Never    Sexual activity: Defer       Problem list reviewed by Joyce Clemens, PharmD on 6/7/2023 at  1:27 PM    Allergies  Known allergies and reactions were discussed with the patient. The patient's chart has been reviewed for allergy information and updated as necessary.   Allergies   Allergen Reactions    Malarone [Atovaquone-Proguanil Hcl] GI Intolerance    Penicillins          Allergies reviewed by Joyce Clemens, PharmD on 6/7/2023 at  1:27 PM    Relevant Laboratory Values          Current Medication List  This medication list has been reviewed with the patient and evaluated for any interactions or necessary modifications/recommendations, and updated to include all prescription medications, OTC medications, and supplements the patient is currently taking.  This list reflects what  is contained in the patient's profile, which has also been marked as reviewed to communicate to other providers it is the most up to date version of the patient's current medication therapy.     Current Outpatient Medications:     Acetaminophen (TYLENOL ARTHRITIS EXT RELIEF PO), Take 1 tablet by mouth Daily., Disp: , Rfl:     albuterol sulfate  (90 Base) MCG/ACT inhaler, ProAir HFA 90 mcg/actuation aerosol inhaler  INHALE ONE TO TWO PUFFS BY MOUTH EVERY 4 TO 6 HOURS AS NEEDED FOR SHORTNESS OF AIR, Disp: , Rfl:     galcanezumab-gnlm (EMGALITY) 120 MG/ML auto-injector pen, Inject 1 mL under the skin into the appropriate area as directed Every 28 (Twenty-Eight) Days., Disp: 1 mL, Rfl: 11    linaclotide (LINZESS) 290 MCG capsule capsule, Linzess 290 mcg capsule  Take 1 capsule every day by oral route for 90 days., Disp: , Rfl:     omeprazole (priLOSEC) 40 MG capsule, , Disp: , Rfl:     SUMAtriptan (IMITREX) 100 MG tablet, TAKE 1 TABLET AT ONSET OF  HEADACHE. MAY REPEAT DOSE  ONE TIME IN 2 HOURS IF     HEADACHE NOT RELIEVED, Disp: 30 tablet, Rfl: 1    Medicines reviewed by Joyce Clemens, PharmD on 6/7/2023 at  1:27 PM    Drug Interactions  none     Adverse Drug Reactions  Adverse Reactions Experienced: none   Plan for ADR Management: not required    Hospitalizations and Urgent Care Since Last Assessment  Hospitalizations or Admissions: none   ED Visits: none  Urgent Office Visits: none     Adherence and Self-Administration  Adherence related patient's specialty therapy was discussed with the patient. The Adherence segment of this outreach has been reviewed and updated.     Medication Adherence    Adherence tools used: directed education  Support network for adherence: healthcare provider        Ongoing or New Barriers to Patient Adherence and/or Self-Administration: none   Methods for Supporting Patient Adherence and/or Self-Administration: none     Goals of Therapy  Goals related to the patient's specialty  therapy was discussed with the patient. The Patient Goals segment of this outreach has been reviewed and updated.    Goals        Specialty Pharmacy General Goal      Decrease frequency, severity and duration of migraine attacks by 50%                 Quality of Life Assessment   Quality of Life related to the patient's specialty therapy was discussed with the patient. The QOL segment of this outreach has been reviewed and updated.     Quality of Life Assessment  Quality of Life Improvement Scale: Moderately better    Reassessment Plan & Follow-Up  Medication Therapy Changes: none  Additional Plans, Therapy Recommendations, or Therapy Problems to Be Addressed: none  Pharmacist to perform regular reassessments no more than (6) months from the previous assessment.  Care Coordinator to set up future refill outreaches, coordinate prescription delivery, and escalate clinical questions to pharmacist.     Attestation  I attest that the specialty medication(s) addressed above are appropriate for the patient based on my reassessment.  If the prescribed therapy is at any point deemed not appropriate based on the current or future assessments, a consultation will be initiated with the patient's specialty care provider to determine the best course of action. The revised plan of therapy will be documented along with any additional patient education provided.     Joyce Clemens, PharmD  6/7/2023  13:29 EDT

## 2023-06-07 NOTE — PROGRESS NOTES
Specialty Pharmacy Refill Coordination Note     Ginette is a 57 y.o. female contacted today regarding refills of  Emgality specialty medication(s).    Patient takes Emgality self-injection on the 15th of each month.    Reviewed and verified with patient:  Allergies  Meds  Problems       Specialty medication(s) and dose(s) confirmed: yes    Refill Questions      Flowsheet Row Most Recent Value   Changes to allergies? No   Changes to medications? No   New conditions since last clinic visit No   Unplanned office visit, urgent care, ED, or hospital admission in the last 4 weeks  No   How does patient/caregiver feel medication is working? Excellent   Financial problems or insurance changes  No   If yes, describe changes in insurance or financial issues. N/A   Since the previous refill, were any specialty medication doses or scheduled injections missed or delayed?  No   Does this patient require a clinical escalation to a pharmacist? No            Delivery Questions      Flowsheet Row Most Recent Value   Delivery method FedEx   Delivery address correct? Yes   Preferred delivery time? Anytime   Number of medications in delivery 1   Medication being filled and delivered Emgality   Doses left of specialty medications 0   Is there any medication that is due not being filled? No   Supplies needed? No supplies needed   Cooler needed? Yes   Do any medications need mixed or dated? No   Copay form of payment Payment plan already set up   Questions or concerns for the pharmacist? No   Are any medications first time fills? No              Medication Adherence    Adherence tools used: directed education  Support network for adherence: healthcare provider          Follow-up: 28 day(s)     Joyce Clemens, PharmD

## 2023-08-09 ENCOUNTER — SPECIALTY PHARMACY (OUTPATIENT)
Dept: ONCOLOGY | Facility: HOSPITAL | Age: 58
End: 2023-08-09
Payer: COMMERCIAL

## 2023-08-09 NOTE — PROGRESS NOTES
Specialty Pharmacy Refill Coordination Note     Ginette is a 57 y.o. female contacted today regarding refills of  Emgality specialty medication(s).. patient Injection is due 8/17    Reviewed and verified with patient:       Specialty medication(s) and dose(s) confirmed: yes    Refill Questions      Flowsheet Row Most Recent Value   Changes to allergies? No   Changes to medications? No   New conditions since last clinic visit No   Unplanned office visit, urgent care, ED, or hospital admission in the last 4 weeks  No   How does patient/caregiver feel medication is working? Very good   Financial problems or insurance changes  No   If yes, describe changes in insurance or financial issues. N/A   Since the previous refill, were any specialty medication doses or scheduled injections missed or delayed?  No   If yes, please provide the amount N/A   Why were doses missed? N/A   Does this patient require a clinical escalation to a pharmacist? No            Delivery Questions      Flowsheet Row Most Recent Value   Delivery method FedEx   Delivery address correct? Yes   Delivery phone number mobile phone   Preferred delivery time? Anytime   Number of medications in delivery 1   Medication being filled and delivered Emgality = $0   Doses left of specialty medications N/A   Is there any medication that is due not being filled? No   Supplies needed? No supplies needed   Cooler needed? Yes   Do any medications need mixed or dated? No   Copay form of payment Payment plan already set up   Additional comments N/A   Questions or concerns for the pharmacist? No   Explain any questions or concerns for the pharmacist N/A   Are any medications first time fills? No              Medication Adherence    Adherence tools used: directed education  Support network for adherence: healthcare provider          Follow-up: 30 day(s)     Binh Ch  Specialty Pharmacy Technician

## 2023-09-07 ENCOUNTER — SPECIALTY PHARMACY (OUTPATIENT)
Dept: ONCOLOGY | Facility: HOSPITAL | Age: 58
End: 2023-09-07
Payer: COMMERCIAL

## 2023-09-07 NOTE — PROGRESS NOTES
Specialty Pharmacy Refill Coordination Note     Ginette is a 57 y.o. female contacted today regarding refills of Emgality specialty medication(s).. patient Injection is due on 9/15    Reviewed and verified with patient:       Specialty medication(s) and dose(s) confirmed: yes    Refill Questions      Flowsheet Row Most Recent Value   Changes to allergies? No   Changes to medications? No   New conditions since last clinic visit No   Unplanned office visit, urgent care, ED, or hospital admission in the last 4 weeks  No   How does patient/caregiver feel medication is working? Very good   Financial problems or insurance changes  No   If yes, describe changes in insurance or financial issues. N/A   Since the previous refill, were any specialty medication doses or scheduled injections missed or delayed?  No   If yes, please provide the amount N/A   Why were doses missed? N/A   Does this patient require a clinical escalation to a pharmacist? No            Delivery Questions      Flowsheet Row Most Recent Value   Delivery method FedEx   Delivery address correct? Yes   Delivery phone number mobile phone   Preferred delivery time? Anytime   Number of medications in delivery 1   Medication being filled and delivered Emgality =$0   Doses left of specialty medications N/A   Is there any medication that is due not being filled? No   Supplies needed? No supplies needed   Cooler needed? Yes   Do any medications need mixed or dated? No   Copay form of payment Payment plan already set up   Additional comments N/A   Questions or concerns for the pharmacist? No   Explain any questions or concerns for the pharmacist N/A   Are any medications first time fills? No   Tracking number for delivery N/A              Medication Adherence    Adherence tools used: directed education  Support network for adherence: healthcare provider          Follow-up: 30 day(s)     Binh Ch  Specialty Pharmacy Technician

## 2023-09-19 ENCOUNTER — OFFICE VISIT (OUTPATIENT)
Dept: NEUROLOGY | Facility: CLINIC | Age: 58
End: 2023-09-19
Payer: COMMERCIAL

## 2023-09-19 VITALS
WEIGHT: 210 LBS | OXYGEN SATURATION: 97 % | BODY MASS INDEX: 35.85 KG/M2 | DIASTOLIC BLOOD PRESSURE: 74 MMHG | SYSTOLIC BLOOD PRESSURE: 128 MMHG | HEART RATE: 99 BPM | HEIGHT: 64 IN

## 2023-09-19 DIAGNOSIS — G43.C0 PERIODIC HEADACHE SYNDROME, NOT INTRACTABLE: ICD-10-CM

## 2023-09-19 DIAGNOSIS — G47.33 OSA (OBSTRUCTIVE SLEEP APNEA): Primary | ICD-10-CM

## 2023-09-19 PROCEDURE — 99214 OFFICE O/P EST MOD 30 MIN: CPT | Performed by: PSYCHIATRY & NEUROLOGY

## 2023-09-19 RX ORDER — SUMATRIPTAN 100 MG/1
TABLET, FILM COATED ORAL
COMMUNITY
End: 2023-09-19 | Stop reason: SDUPTHER

## 2023-09-19 RX ORDER — PANTOPRAZOLE SODIUM 40 MG/1
1 TABLET, DELAYED RELEASE ORAL EVERY 12 HOURS SCHEDULED
COMMUNITY
Start: 2023-08-05

## 2023-09-19 NOTE — PROGRESS NOTES
"Chief Complaint  Sleep Apnea and Migraine    Subjective        Ginette Georges presents to [facility] NEUROLOGY  History of Present Illness    57 y.o. female returns in follow up.  Last visit on 6/23/22 continued CPAP, Emgality, sumatriptan.     ASHISH     Supplier is [employer]     Uses nasal pillows.       Compliant with CPAP and receiving benefit.       Uses a travel machine on most nights.      AHI 7.3      AHI 26.7 treated with CPAP 11 cm H20.       Migraines     Frequency decreased two - three a month.  Last for up to 1 hours. Imitrex aborts in an hour using 2 - 3 a month,.          Starts in forehead and/or OD radiates to vertex pressure quality.  Mild to moderate intensity.  Can lose sight in right eye.        Preventatives: TPM-word finding difficulties, TCAD, Beta blockers, CGRP-Aimovig  Abortives: Sumatriptan, Tylenol, Excedrin, Imitrex    Objective   Vital Signs:  /74   Pulse 99   Ht 162.6 cm (64.02\")   Wt 95.3 kg (210 lb)   SpO2 97%   BMI 36.03 kg/m²   Estimated body mass index is 36.03 kg/m² as calculated from the following:    Height as of this encounter: 162.6 cm (64.02\").    Weight as of this encounter: 95.3 kg (210 lb).           Neurologic Exam     Mental Status   Oriented to person, place, and time.   Speech: speech is normal   Level of consciousness: alert  Knowledge: good and consistent with education.   Normal comprehension.     Cranial Nerves   Cranial nerves II through XII intact.     CN II   Visual fields full to confrontation.   Visual acuity: normal  Right visual field deficit: none  Left visual field deficit: none     CN III, IV, VI   Pupils are equal, round, and reactive to light.  Extraocular motions are normal.   Nystagmus: none   Diplopia: none  Ophthalmoparesis: none  Upgaze: normal  Downgaze: normal  Conjugate gaze: present    CN V   Facial sensation intact.   Right corneal reflex: normal  Left corneal reflex: normal    CN VII   Right facial weakness: " none  Left facial weakness: none    CN VIII   Hearing: intact    CN IX, X   Palate: symmetric  Right gag reflex: normal  Left gag reflex: normal    CN XI   Right sternocleidomastoid strength: normal  Left sternocleidomastoid strength: normal    CN XII   Tongue: not atrophic  Fasciculations: absent  Tongue deviation: none    Motor Exam   Muscle bulk: normal  Overall muscle tone: normal    Strength   Strength 5/5 throughout.     Sensory Exam   Light touch normal.     Gait, Coordination, and Reflexes     Gait  Gait: normal    Tremor   Resting tremor: absent  Intention tremor: absent  Action tremor: absent    Reflexes   Reflexes 2+ except as noted.    Physical Exam  Eyes:      Extraocular Movements: EOM normal.      Pupils: Pupils are equal, round, and reactive to light.   Neurological:      Mental Status: She is oriented to person, place, and time.      Cranial Nerves: Cranial nerves 2-12 are intact.      Motor: Motor strength is normal.      Gait: Gait is intact.   Psychiatric:         Speech: Speech normal.      Result Review :  The following data was reviewed by: Brown Cruz MD on 09/19/2023:                 Assessment and Plan   Diagnoses and all orders for this visit:    1. ASHISH (obstructive sleep apnea) (Primary)  Assessment & Plan:  Continue CPAP       2. Periodic headache syndrome, not intractable  Assessment & Plan:  Headaches are improving with treatment.  Continue current treatment regimen.    Emgality and Imitrex                    Follow Up   Return in about 1 year (around 9/19/2024).  Patient was given instructions and counseling regarding her condition or for health maintenance advice. Please see specific information pulled into the AVS if appropriate.

## 2023-09-19 NOTE — ASSESSMENT & PLAN NOTE
Headaches are improving with treatment.  Continue current treatment regimen.    Emgality and Imitrex

## 2023-10-10 ENCOUNTER — SPECIALTY PHARMACY (OUTPATIENT)
Dept: ONCOLOGY | Facility: HOSPITAL | Age: 58
End: 2023-10-10
Payer: COMMERCIAL

## 2023-10-10 DIAGNOSIS — G43.C0 PERIODIC HEADACHE SYNDROME, NOT INTRACTABLE: ICD-10-CM

## 2023-10-10 NOTE — PROGRESS NOTES
Specialty Pharmacy Refill Coordination Note     Ginette is a 57 y.o. female contacted today regarding refills of  Emgality specialty medication(s).. patient Injection is due on 10/15    Reviewed and verified with patient:       Specialty medication(s) and dose(s) confirmed: yes    Refill Questions      Flowsheet Row Most Recent Value   Changes to allergies? No   Changes to medications? No   New conditions since last clinic visit No   Unplanned office visit, urgent care, ED, or hospital admission in the last 4 weeks  No   How does patient/caregiver feel medication is working? Very good   Financial problems or insurance changes  No   If yes, describe changes in insurance or financial issues. N/A   Since the previous refill, were any specialty medication doses or scheduled injections missed or delayed?  No   If yes, please provide the amount N/A   Why were doses missed? N/A   Does this patient require a clinical escalation to a pharmacist? No            Delivery Questions      Flowsheet Row Most Recent Value   Delivery method FedEx   Delivery address correct? Yes   Delivery phone number mobile phone   Preferred delivery time? Anytime   Number of medications in delivery 1   Medication(s) being filled and delivered Galcanezumab-Gnlm   Doses left of specialty medications N/A   Is there any medication that is due not being filled? No   Supplies needed? No supplies needed   Cooler needed? Yes   Do any medications need mixed or dated? No   Copay form of payment Payment plan already set up   Additional comments N/A   Questions or concerns for the pharmacist? No   Explain any questions or concerns for the pharmacist N/A   Are any medications first time fills? No   Tracking number for delivery N/A              Medication Adherence          Adherence tools used: directed education      Support network for adherence: healthcare provider                Follow-up: 30 day(s)     Binh Ch  Specialty Pharmacy Technician

## 2023-10-10 NOTE — PROGRESS NOTES
Specialty Pharmacy Patient Management Program  Neurology Reassessment     Ginette Georges is a 57 y.o. female with migraines seen by a Neurology provider and enrolled in the Neurology Patient Management program offered by [facility].  A follow-up outreach was conducted, including assessment of continued therapy appropriateness, medication adherence, and side effect incidence and management for Emgality.     Changes to Insurance Coverage or Financial Support  [employer], Emgality co-pay card     Relevant Past Medical History and Comorbidities  Relevant medical history and concomitant health conditions were discussed with the patient. The patient's chart has been reviewed for relevant past medical history and comorbid health conditions and updated as necessary.   Past Medical History:   Diagnosis Date    Anemia     Anxiety     Arthritis     Depression     Migraine     Sleep apnea 2013?     Social History     Socioeconomic History    Marital status:    Tobacco Use    Smoking status: Never     Passive exposure: Never    Smokeless tobacco: Never   Vaping Use    Vaping Use: Never used   Substance and Sexual Activity    Alcohol use: Yes     Alcohol/week: 2.0 standard drinks of alcohol     Types: 2 Glasses of wine per week    Drug use: Never    Sexual activity: Not Currently     Partners: Male     Birth control/protection: Post-menopausal     Problem list reviewed by Joyce Clemens, PharmD on 10/10/2023 at 10:40 AM    Hospitalizations and Urgent Care Since Last Assessment  ED Visits, Admissions, or Hospitalizations: none   Urgent Office Visits: none     Allergies  Known allergies and reactions were discussed with the patient. The patient's chart has been reviewed for allergy information and updated as necessary.   Allergies   Allergen Reactions    Atovaquone-Proguanil Hcl GI Intolerance and Unknown - Low Severity     Malarone    Penicillin G Unknown - High Severity    Penicillins       Allergies reviewed by Joyce Clemens, PharmD on 10/10/2023 at 10:40 AM    Relevant Laboratory Values      Lab Assessment  N/A    Current Medication List  This medication list has been reviewed with the patient and evaluated for any interactions or necessary modifications/recommendations, and updated to include all prescription medications, OTC medications, and supplements the patient is currently taking.  This list reflects what is contained in the patient's profile, which has also been marked as reviewed to communicate to other providers it is the most up to date version of the patient's current medication therapy.     Current Outpatient Medications:     Acetaminophen (TYLENOL ARTHRITIS EXT RELIEF PO), Take 1 tablet by mouth Daily., Disp: , Rfl:     albuterol sulfate  (90 Base) MCG/ACT inhaler, ProAir HFA 90 mcg/actuation aerosol inhaler  INHALE ONE TO TWO PUFFS BY MOUTH EVERY 4 TO 6 HOURS AS NEEDED FOR SHORTNESS OF AIR, Disp: , Rfl:     FAMOTIDINE-CA CARB-MAG HYDROX PO, , Disp: , Rfl:     galcanezumab-gnlm (EMGALITY) 120 MG/ML auto-injector pen, Inject 1 mL under the skin into the appropriate area as directed Every 28 (Twenty-Eight) Days., Disp: 1 mL, Rfl: 2    linaclotide (LINZESS) 290 MCG capsule capsule, Daily., Disp: , Rfl:     Naproxen Sodium (ALEVE PO), Aleve  As needed, Disp: , Rfl:     pantoprazole (PROTONIX) 40 MG EC tablet, Take 1 tablet by mouth Every 12 (Twelve) Hours., Disp: , Rfl:     SUMAtriptan (IMITREX) 100 MG tablet, TAKE 1 TABLET AT ONSET OF  HEADACHE. MAY REPEAT DOSE  ONE TIME IN 2 HOURS IF     HEADACHE NOT RELIEVED, Disp: 30 tablet, Rfl: 0    Medicines reviewed by Joyce Clemens, PharmD on 10/10/2023 at 10:40 AM    Drug Interactions  none     Adverse Drug Reactions  Medication tolerability: Tolerating with no to minimal ADRs          Medication plan: Continue therapy with normal follow-up  Plan for ADR Management: not required      Adherence, Self-Administration, and Current  Therapy Problems  Adherence related patient's specialty therapy was discussed with the patient. The Adherence segment of this outreach has been reviewed and updated.   Adherence Questions  Linked Medication(s) Assessed: Galcanezumab-Gnlm  On average, how many doses/injections does the patient miss per month?: 0  What are the identified reasons for non-adherence or missed doses? : no problems identfied  What is the estimated medication adherence level?: %  Based on the patient/caregiver response and refill history, does this patient require an MTP to track adherence improvements?: no    Additional Barriers to Patient Self-Administration: none  Methods for Supporting Patient Self-Administration: pt adept with Emgality self-injections    Recently Close Medication Therapy Problems  No medication therapy recommendations to display  Open Medication Therapy Problems  No medication therapy recommendations to display     Goals of Therapy  Goals related to the patient's specialty therapy was discussed with the patient. The Patient Goals segment of this outreach has been reviewed and updated.    Goals Addressed Today        Specialty Pharmacy General Goal      Decrease frequency, severity and duration of migraine attacks.  9/19/23  provider notes:  Frequency decreased two - three a month.  Last for up to 1 hours. Imitrex aborts in an hour using 2 - 3 a month,.                           Quality of Life Assessment   Quality of Life related to the patient's enrollment in the patient management program and services provided was discussed with the patient. The QOL segment of this outreach has been reviewed and updated.   Quality of Life Improvement Scale: Moderately better    Reassessment Plan & Follow-Up  Medication Therapy Changes: no, changes - Continue Emgality 120 mg SubQ monthly  Related Plans, Therapy Recommendations, or Issues to Be Addressed: none  Pharmacist to perform regular reassessments no more than (6) months  from the previous assessment.  Care Coordinator to set up future refill outreaches, coordinate prescription delivery, and escalate clinical questions to pharmacist.     Attestation  Therapeutic appropriateness: Appropriate  I attest the patient was actively involved in and has agreed to the above plan of care. If the prescribed therapy is at any point deemed not appropriate based on the current or future assessments, a consultation will be initiated with the patient's specialty care provider to determine the best course of action. The revised plan of therapy will be documented along with any additional patient education provided. Discussed aforementioned material with patient via telemedicine.    Joyce Clemens, PharmD, [facility]  Clinic Specialty Pharmacist, Neurology  10/10/2023  10:41 EDT

## 2023-11-02 ENCOUNTER — DOCUMENTATION (OUTPATIENT)
Dept: ONCOLOGY | Facility: HOSPITAL | Age: 58
End: 2023-11-02
Payer: COMMERCIAL

## 2023-11-02 ENCOUNTER — SPECIALTY PHARMACY (OUTPATIENT)
Dept: ONCOLOGY | Facility: HOSPITAL | Age: 58
End: 2023-11-02
Payer: COMMERCIAL

## 2023-11-02 NOTE — PROGRESS NOTES
Specialty Pharmacy Refill Coordination Note     Ginette is a 57 y.o. female contacted today regarding refills of  Emgality specialty medication(s).. patient Injection is due on 11/15    Reviewed and verified with patient:       Specialty medication(s) and dose(s) confirmed: yes    Refill Questions      Flowsheet Row Most Recent Value   Changes to allergies? No   Changes to medications? No   New conditions since last clinic visit No   Unplanned office visit, urgent care, ED, or hospital admission in the last 4 weeks  No   How does patient/caregiver feel medication is working? Very good   Financial problems or insurance changes  No   If yes, describe changes in insurance or financial issues. N/A   Since the previous refill, were any specialty medication doses or scheduled injections missed or delayed?  No   If yes, please provide the amount N/A   Why were doses missed? N/A   Does this patient require a clinical escalation to a pharmacist? No            Delivery Questions      Flowsheet Row Most Recent Value   Delivery method FedEx   Delivery address correct? Yes   Delivery phone number mobile phone   Preferred delivery time? Anytime   Number of medications in delivery 1   Medication(s) being filled and delivered Galcanezumab-Gnlm   Doses left of specialty medications N/A   Is there any medication that is due not being filled? No   Supplies needed? No supplies needed   Cooler needed? Yes   Do any medications need mixed or dated? No   Copay form of payment Payment plan already set up   Additional comments N/A   Questions or concerns for the pharmacist? No   Explain any questions or concerns for the pharmacist N/A   Are any medications first time fills? No   Tracking number for delivery N/A              Medication Adherence          Adherence tools used: directed education      Support network for adherence: healthcare provider                Follow-up: 30 day(s)     Binh Ch  Specialty Pharmacy Technician

## 2023-12-06 ENCOUNTER — SPECIALTY PHARMACY (OUTPATIENT)
Dept: ONCOLOGY | Facility: HOSPITAL | Age: 58
End: 2023-12-06
Payer: COMMERCIAL

## 2023-12-06 NOTE — PROGRESS NOTES
Specialty Pharmacy Refill Coordination Note     Ginette is a 57 y.o. female contacted today regarding refills of Emgality specialty medication(s).. patient Injection is due on 12/15    Reviewed and verified with patient:       Specialty medication(s) and dose(s) confirmed: yes    Refill Questions      Flowsheet Row Most Recent Value   Changes to allergies? No   Changes to medications? No   New conditions since last clinic visit No   Unplanned office visit, urgent care, ED, or hospital admission in the last 4 weeks  No   How does patient/caregiver feel medication is working? Very good   Financial problems or insurance changes  No   If yes, describe changes in insurance or financial issues. N/A   Since the previous refill, were any specialty medication doses or scheduled injections missed or delayed?  No   If yes, please provide the amount N/A   Why were doses missed? N/A   Does this patient require a clinical escalation to a pharmacist? No            Delivery Questions      Flowsheet Row Most Recent Value   Delivery method FedEx   Delivery address correct? Yes   Delivery phone number mobile phone   Preferred delivery time? Anytime   Number of medications in delivery 1   Medication(s) being filled and delivered Galcanezumab-Gnlm   Doses left of specialty medications N/A   Is there any medication that is due not being filled? No   Supplies needed? No supplies needed   Cooler needed? Yes   Do any medications need mixed or dated? No   Copay form of payment Payment plan already set up   Additional comments N/A   Questions or concerns for the pharmacist? No   Explain any questions or concerns for the pharmacist N/A   Are any medications first time fills? No   Tracking number for delivery N/A              Medication Adherence          Adherence tools used: directed education      Support network for adherence: healthcare provider                Follow-up: 30 day(s)     Binh Ch  Specialty Pharmacy Technician

## 2023-12-29 ENCOUNTER — SPECIALTY PHARMACY (OUTPATIENT)
Dept: ONCOLOGY | Facility: HOSPITAL | Age: 58
End: 2023-12-29
Payer: COMMERCIAL

## 2023-12-29 NOTE — PROGRESS NOTES
Specialty Pharmacy Refill Coordination Note     Ginette is a 58 y.o. female contacted today regarding refills of  [facility] specialty medication(s).    Reviewed and verified with patient:       Specialty medication(s) and dose(s) confirmed: yes    Refill Questions      Flowsheet Row Most Recent Value   Changes to allergies? No   Changes to medications? No   New conditions or infections since last clinic visit No   Unplanned office visit, urgent care, ED, or hospital admission in the last 4 weeks  No   How does patient/caregiver feel medication is working? Very good   Financial problems or insurance changes  No   If yes, describe changes in insurance or financial issues. N/A   Since the previous refill, were any specialty medication doses or scheduled injections missed or delayed?  No   If yes, please provide the amount N/A   Why were doses missed? N/A   Does this patient require a clinical escalation to a pharmacist? No            Delivery Questions      Flowsheet Row Most Recent Value   Delivery method FedEx   Delivery address verified with patient/caregiver? Yes   Delivery address Home   Number of medications in delivery 1   Medication(s) being filled and delivered Galcanezumab-Gnlm   Doses left of specialty medications N/A   Copay verified? Yes   Copay amount Emgality co-pay $0   Copay form of payment No copayment ($0)              Medication Adherence          Adherence tools used: directed education      Support network for adherence: healthcare provider                Follow-up: 28 day(s)     Joyce Clemens, JannethD

## 2024-01-05 DIAGNOSIS — G43.C0 PERIODIC HEADACHE SYNDROME, NOT INTRACTABLE: ICD-10-CM

## 2024-01-05 RX ORDER — SUMATRIPTAN 100 MG/1
TABLET, FILM COATED ORAL
Qty: 27 TABLET | Refills: 5 | Status: SHIPPED | OUTPATIENT
Start: 2024-01-05

## 2024-01-05 NOTE — TELEPHONE ENCOUNTER
Rx Refill Note  Requested Prescriptions     Pending Prescriptions Disp Refills    SUMAtriptan (IMITREX) 100 MG tablet [Pharmacy Med Name: SUMATRIPTAN  TAB 100MG] 27 tablet 0     Sig: TAKE 1 TABLET AT ONSET OF  HEADACHE. MAY REPEAT DOSE  ONE TIME IN 2 HOURS IF     HEADACHE NOT RELIEVED      Last filled: 7/7/23 30 with 0 refills.  Last office visit with prescribing clinician: 9/19/23      Next office visit with prescribing clinician: 9/20/24     Sent in 27 with 5 refills.    Cecilia Romero MA  01/05/24, 09:51 EST

## 2024-01-19 ENCOUNTER — SPECIALTY PHARMACY (OUTPATIENT)
Dept: ONCOLOGY | Facility: HOSPITAL | Age: 59
End: 2024-01-19
Payer: COMMERCIAL

## 2024-01-19 NOTE — PROGRESS NOTES
Specialty Pharmacy Refill Coordination Note     Ginette is a 58 y.o. female contacted today regarding refills of Emgality specialty medication(s).. patient Injection is due on 02/12    Reviewed and verified with patient:       Specialty medication(s) and dose(s) confirmed: yes    Refill Questions      Flowsheet Row Most Recent Value   Changes to allergies? No   Changes to medications? No   New conditions or infections since last clinic visit No   Unplanned office visit, urgent care, ED, or hospital admission in the last 4 weeks  No   How does patient/caregiver feel medication is working? Very good   Financial problems or insurance changes  No   If yes, describe changes in insurance or financial issues. N/A   Since the previous refill, were any specialty medication doses or scheduled injections missed or delayed?  No   If yes, please provide the amount N/A   Why were doses missed? N/A   Does this patient require a clinical escalation to a pharmacist? No            Delivery Questions      Flowsheet Row Most Recent Value   Delivery method [employer]   Delivery address verified with patient/caregiver? Yes   Delivery address Home   Number of medications in delivery 1   Medication(s) being filled and delivered Galcanezumab-Gnlm   Doses left of specialty medications N/A   Copay verified? Yes   Copay amount N/A   Copay form of payment No copayment ($0)              Medication Adherence          Adherence tools used: directed education      Support network for adherence: healthcare provider                Follow-up: 28 day(s)     Binh hC  Specialty Pharmacy Technician

## 2024-02-19 ENCOUNTER — SPECIALTY PHARMACY (OUTPATIENT)
Dept: ONCOLOGY | Facility: HOSPITAL | Age: 59
End: 2024-02-19
Payer: COMMERCIAL

## 2024-02-19 NOTE — PROGRESS NOTES
Specialty Pharmacy Refill Coordination Note     Ginette is a 58 y.o. female contacted today regarding refills of Emgality specialty medication(s).. patient Injection is due on 02/20    Reviewed and verified with patient:       Specialty medication(s) and dose(s) confirmed: yes    Refill Questions      Flowsheet Row Most Recent Value   Changes to allergies? No   Changes to medications? No   New conditions or infections since last clinic visit No   Unplanned office visit, urgent care, ED, or hospital admission in the last 4 weeks  No   How does patient/caregiver feel medication is working? Very good   Financial problems or insurance changes  No   If yes, describe changes in insurance or financial issues. N/A   Since the previous refill, were any specialty medication doses or scheduled injections missed or delayed?  No   If yes, please provide the amount N/A   Why were doses missed? N/A   Does this patient require a clinical escalation to a pharmacist? No            Delivery Questions      Flowsheet Row Most Recent Value   Delivery method [employer]   Delivery address verified with patient/caregiver? Yes   Delivery address Home   Number of medications in delivery 1   Medication(s) being filled and delivered Galcanezumab-Gnlm   Doses left of specialty medications N/A   Copay verified? Yes   Copay amount N/A   Copay form of payment No copayment ($0)              Medication Adherence          Adherence tools used: directed education      Support network for adherence: healthcare provider                Follow-up: 28 day(s)     Binh Ch  Specialty Pharmacy Technician

## 2024-03-18 ENCOUNTER — SPECIALTY PHARMACY (OUTPATIENT)
Dept: ONCOLOGY | Facility: HOSPITAL | Age: 59
End: 2024-03-18
Payer: COMMERCIAL

## 2024-03-18 NOTE — PROGRESS NOTES
Specialty Pharmacy Refill Coordination Note     Pt's Emgality shipped on 2/19 for delivery on 2/20 - this shipment was lost by [employer], and not returned to the pharmacy until last week.  We have re-shipped Emgality to patient.  This delay will cause pt to be a couple of days late with her dose.   Joyce Clemens, PharmD  3/18/2024

## 2024-03-18 NOTE — PROGRESS NOTES
Specialty Pharmacy Patient Management Program  Neurology Reassessment     Ginette Georges is a 58 y.o. female with migraines seen by a Neurology provider and enrolled in the Neurology Patient Management program offered by [facility].  A follow-up outreach was conducted, including assessment of continued therapy appropriateness, medication adherence, and side effect incidence and management for Emgality.     Changes to Insurance Coverage or Financial Support  [employer], Emgality co-pay card     Relevant Past Medical History and Comorbidities  Relevant medical history and concomitant health conditions were discussed with the patient. The patient's chart has been reviewed for relevant past medical history and comorbid health conditions and updated as necessary.   Past Medical History:   Diagnosis Date    Anemia     Anxiety     Arthritis     Depression     Migraine     Sleep apnea 2013?     Social History     Socioeconomic History    Marital status:    Tobacco Use    Smoking status: Never     Passive exposure: Never    Smokeless tobacco: Never   Vaping Use    Vaping status: Never Used   Substance and Sexual Activity    Alcohol use: Yes     Alcohol/week: 2.0 standard drinks of alcohol     Types: 2 Glasses of wine per week    Drug use: Never    Sexual activity: Not Currently     Partners: Male     Birth control/protection: Post-menopausal     Problem list reviewed by Joyce Clemens, PharmD on 3/18/2024 at 10:21 AM    Hospitalizations and Urgent Care Since Last Assessment  ED Visits, Admissions, or Hospitalizations: none   Urgent Office Visits: none     Allergies  Known allergies and reactions were discussed with the patient. The patient's chart has been reviewed for allergy information and updated as necessary.   Allergies   Allergen Reactions    Atovaquone-Proguanil Hcl GI Intolerance and Unknown - Low Severity     Malarone    Penicillin G Unknown - High Severity    Penicillins       Allergies reviewed by Joyce Clemens, PharmD on 3/18/2024 at 10:21 AM    Relevant Laboratory Values      Lab Assessment  N/A.     Current Medication List  This medication list has been reviewed with the patient and evaluated for any interactions or necessary modifications/recommendations, and updated to include all prescription medications, OTC medications, and supplements the patient is currently taking.  This list reflects what is contained in the patient's profile, which has also been marked as reviewed to communicate to other providers it is the most up to date version of the patient's current medication therapy.     Current Outpatient Medications:     Acetaminophen (TYLENOL ARTHRITIS EXT RELIEF PO), Take 1 tablet by mouth Daily., Disp: , Rfl:     albuterol sulfate  (90 Base) MCG/ACT inhaler, ProAir HFA 90 mcg/actuation aerosol inhaler  INHALE ONE TO TWO PUFFS BY MOUTH EVERY 4 TO 6 HOURS AS NEEDED FOR SHORTNESS OF AIR, Disp: , Rfl:     FAMOTIDINE-CA CARB-MAG HYDROX PO, , Disp: , Rfl:     galcanezumab-gnlm (EMGALITY) 120 MG/ML auto-injector pen, Inject 1 mL under the skin into the appropriate area as directed Every 28 (Twenty-Eight) Days., Disp: 1 mL, Rfl: 11    linaclotide (LINZESS) 290 MCG capsule capsule, Daily., Disp: , Rfl:     Naproxen Sodium (ALEVE PO), Aleve  As needed, Disp: , Rfl:     pantoprazole (PROTONIX) 40 MG EC tablet, Take 1 tablet by mouth Every 12 (Twelve) Hours., Disp: , Rfl:     SUMAtriptan (IMITREX) 100 MG tablet, TAKE 1 TABLET AT ONSET OF  HEADACHE. MAY REPEAT DOSE  ONE TIME IN 2 HOURS IF     HEADACHE NOT RELIEVED, Disp: 27 tablet, Rfl: 5    Medicines reviewed by Joyce Clemens, PharmD on 3/18/2024 at 10:21 AM    Drug Interactions  No relevant drug/drug interactions noted with Emgality     Adverse Drug Reactions  Medication tolerability: Tolerating with no to minimal ADRs          Medication plan: Continue therapy with normal follow-up  Plan for ADR Management: not  required      Adherence, Self-Administration, and Current Therapy Problems  Adherence related patient's specialty therapy was discussed with the patient. The Adherence segment of this outreach has been reviewed and updated.   Adherence Questions  Linked Medication(s) Assessed: Galcanezumab-Gnlm  On average, how many doses/injections does the patient miss per month?: 0  What are the identified reasons for non-adherence or missed doses? : no problems identified  What is the estimated medication adherence level?: %  Based on the patient/caregiver response and refill history, does this patient require an MTP to track adherence improvements?: no    Additional Barriers to Patient Self-Administration: none  Methods for Supporting Patient Self-Administration: none    Recently Close Medication Therapy Problems  No medication therapy recommendations to display  Open Medication Therapy Problems  No medication therapy recommendations to display     Goals of Therapy  Goals related to the patient's specialty therapy was discussed with the patient. The Patient Goals segment of this outreach has been reviewed and updated.    Goals Addressed Today        Specialty Pharmacy General Goal      Decrease frequency, severity and duration of migraine attacks.  9/19/23  provider notes:  Frequency decreased two - three a month.  Last for up to 1 hours. Imitrex aborts in an hour using 2 - 3 a month,.                           Quality of Life Assessment   Quality of Life related to the patient's enrollment in the patient management program and services provided was discussed with the patient. The QOL segment of this outreach has been reviewed and updated.   Quality of Life Improvement Scale: 8-Moderately better    Reassessment Plan & Follow-Up  Medication Therapy Changes: Continue Emgality 120 mg SubQ monthly  Related Plans, Therapy Recommendations, or Issues to Be Addressed: none  Pharmacist to perform regular reassessments no more than  (6) months from the previous assessment.  Care Coordinator to set up future refill outreaches, coordinate prescription delivery, and escalate clinical questions to pharmacist.     Attestation  Therapeutic appropriateness: Appropriate  I attest the patient was actively involved in and has agreed to the above plan of care. If the prescribed therapy is at any point deemed not appropriate based on the current or future assessments, a consultation will be initiated with the patient's specialty care provider to determine the best course of action. The revised plan of therapy will be documented along with any additional patient education provided. Discussed aforementioned material with patient via telemedicine.    Joyce Clemens, PharmD, [facility]  Clinic Specialty Pharmacist, Neurology  3/18/2024  10:31 EDT

## 2024-04-09 ENCOUNTER — SPECIALTY PHARMACY (OUTPATIENT)
Dept: ONCOLOGY | Facility: HOSPITAL | Age: 59
End: 2024-04-09
Payer: COMMERCIAL

## 2024-04-09 NOTE — PROGRESS NOTES
Specialty Pharmacy Refill Coordination Note     Ginette is a 58 y.o. female contacted today regarding refills of Emgality specialty medication(s).Patient is due for injection on 04/15.    Reviewed and verified with patient:       Specialty medication(s) and dose(s) confirmed: yes    Refill Questions      Flowsheet Row Most Recent Value   Changes to allergies? No   Changes to medications? No   New conditions or infections since last clinic visit No   Unplanned office visit, urgent care, ED, or hospital admission in the last 4 weeks  No   How does patient/caregiver feel medication is working? Good   Financial problems or insurance changes  No   If yes, describe changes in insurance or financial issues. N/A   Since the previous refill, were any specialty medication doses or scheduled injections missed or delayed?  No   If yes, please provide the amount N/A   Why were doses missed? N/A   Does this patient require a clinical escalation to a pharmacist? No            Delivery Questions      Flowsheet Row Most Recent Value   Delivery method [employer]   Delivery address verified with patient/caregiver? Yes   Delivery address Home   Number of medications in delivery 1   Medication(s) being filled and delivered Galcanezumab-Gnlm   Doses left of specialty medications N/A   Copay verified? Yes   Copay amount N/A   Copay form of payment No copayment ($0)              Medication Adherence    Adherence tools used: directed education  Support network for adherence: healthcare provider          Follow-up: 28 day(s)     Binh Ch  Specialty Pharmacy Technician

## 2024-05-07 ENCOUNTER — SPECIALTY PHARMACY (OUTPATIENT)
Dept: ONCOLOGY | Facility: HOSPITAL | Age: 59
End: 2024-05-07
Payer: COMMERCIAL

## 2024-05-07 RX ORDER — LISINOPRIL 20 MG/1
20 TABLET ORAL DAILY
COMMUNITY
Start: 2024-04-12

## 2024-05-29 ENCOUNTER — SPECIALTY PHARMACY (OUTPATIENT)
Dept: ONCOLOGY | Facility: HOSPITAL | Age: 59
End: 2024-05-29
Payer: COMMERCIAL

## 2024-05-29 NOTE — PROGRESS NOTES
Specialty Pharmacy Refill Coordination Note     Ginette is a 58 y.o. female contacted today regarding refills of Emgality specialty medication(s)..Patient is due for injection on 06/15.    Reviewed and verified with patient:       Specialty medication(s) and dose(s) confirmed: yes    Refill Questions      Flowsheet Row Most Recent Value   Changes to allergies? No   Changes to medications? No   New conditions or infections since last clinic visit No   Unplanned office visit, urgent care, ED, or hospital admission in the last 4 weeks  No   How does patient/caregiver feel medication is working? Good   Financial problems or insurance changes  No   If yes, describe changes in insurance or financial issues. N/A   Since the previous refill, were any specialty medication doses or scheduled injections missed or delayed?  No   If yes, please provide the amount N/A   Why were doses missed? N/A   Does this patient require a clinical escalation to a pharmacist? No            Delivery Questions      Flowsheet Row Most Recent Value   Delivery method [employer]Ex   Delivery address verified with patient/caregiver? Yes   Delivery address Home   Number of medications in delivery 1   Medication(s) being filled and delivered Galcanezumab-Gnlm   Doses left of specialty medications N/A   Copay verified? Yes   Copay amount N/A   Copay form of payment No copayment ($0)              Medication Adherence    Adherence tools used: directed education  Support network for adherence: healthcare provider          Follow-up: 28 day(s)     Binh Ch  Specialty Pharmacy Technician

## 2024-06-24 ENCOUNTER — SPECIALTY PHARMACY (OUTPATIENT)
Dept: ONCOLOGY | Facility: HOSPITAL | Age: 59
End: 2024-06-24
Payer: COMMERCIAL

## 2024-06-24 NOTE — PROGRESS NOTES
Specialty Pharmacy Refill Coordination Note     Ginette is a 58 y.o. female contacted today regarding refills of Emgality specialty medication(s).Patient is due for injection on 07/15.    Reviewed and verified with patient:       Specialty medication(s) and dose(s) confirmed: yes    Refill Questions      Flowsheet Row Most Recent Value   Changes to allergies? No   Changes to medications? No   New conditions or infections since last clinic visit No   Unplanned office visit, urgent care, ED, or hospital admission in the last 4 weeks  No   How does patient/caregiver feel medication is working? Good   Financial problems or insurance changes  No   If yes, describe changes in insurance or financial issues. N/A   Since the previous refill, were any specialty medication doses or scheduled injections missed or delayed?  No   If yes, please provide the amount N/A   Why were doses missed? N/A   Does this patient require a clinical escalation to a pharmacist? No            Delivery Questions      Flowsheet Row Most Recent Value   Delivery method [employer]   Delivery address verified with patient/caregiver? Yes   Delivery address Home   Number of medications in delivery 1   Medication(s) being filled and delivered Galcanezumab-Gnlm   Doses left of specialty medications N/A   Copay verified? Yes   Copay amount N/A   Copay form of payment No copayment ($0)              Medication Adherence    Adherence tools used: directed education  Support network for adherence: healthcare provider          Follow-up: 28 day(s)     Binh Ch  Specialty Pharmacy Technician

## 2024-07-24 ENCOUNTER — SPECIALTY PHARMACY (OUTPATIENT)
Dept: ONCOLOGY | Facility: HOSPITAL | Age: 59
End: 2024-07-24
Payer: COMMERCIAL

## 2024-07-24 NOTE — PROGRESS NOTES
Specialty Pharmacy Refill Coordination Note     Ginette is a 58 y.o. female contacted today regarding refills of Emgality specialty medication(s).Patient is due for injection on 08/12.    Reviewed and verified with patient:       Specialty medication(s) and dose(s) confirmed: yes    Refill Questions      Flowsheet Row Most Recent Value   Changes to allergies? No   Changes to medications? No   New conditions or infections since last clinic visit No   Unplanned office visit, urgent care, ED, or hospital admission in the last 4 weeks  No   How does patient/caregiver feel medication is working? Good   Financial problems or insurance changes  No   If yes, describe changes in insurance or financial issues. N/A   Since the previous refill, were any specialty medication doses or scheduled injections missed or delayed?  No   If yes, please provide the amount N/A   Why were doses missed? N/A   Does this patient require a clinical escalation to a pharmacist? No            Delivery Questions      Flowsheet Row Most Recent Value   Delivery method [employer]   Delivery address verified with patient/caregiver? Yes   Delivery address Home   Number of medications in delivery 1   Medication(s) being filled and delivered Galcanezumab-Gnlm   Doses left of specialty medications N/A   Copay verified? Yes   Copay amount N/A   Copay form of payment No copayment ($0)   Ship Date 07/25   Delivery Date 07/26   Signature Required No              Medication Adherence    Adherence tools used: directed education  Support network for adherence: healthcare provider          Follow-up: 28 day(s)     Binh Ch  Specialty Pharmacy Technician

## 2024-08-16 ENCOUNTER — SPECIALTY PHARMACY (OUTPATIENT)
Dept: ONCOLOGY | Facility: HOSPITAL | Age: 59
End: 2024-08-16
Payer: COMMERCIAL

## 2024-08-19 ENCOUNTER — SPECIALTY PHARMACY (OUTPATIENT)
Dept: ONCOLOGY | Facility: HOSPITAL | Age: 59
End: 2024-08-19
Payer: COMMERCIAL

## 2024-08-19 NOTE — PROGRESS NOTES
Specialty Pharmacy Refill Coordination Note     Ginette is a 58 y.o. female contacted today regarding refills of Emgality specialty medication(s).Patient is due for injection on 08/20    Reviewed and verified with patient:       Specialty medication(s) and dose(s) confirmed: yes    Refill Questions      Flowsheet Row Most Recent Value   Changes to allergies? No   Changes to medications? No   New conditions or infections since last clinic visit No   Unplanned office visit, urgent care, ED, or hospital admission in the last 4 weeks  No   How does patient/caregiver feel medication is working? Good   Financial problems or insurance changes  No   If yes, describe changes in insurance or financial issues. N/A   Since the previous refill, were any specialty medication doses or scheduled injections missed or delayed?  No   If yes, please provide the amount N/A   Why were doses missed? N/A   Does this patient require a clinical escalation to a pharmacist? No            Delivery Questions      Flowsheet Row Most Recent Value   Delivery method [employer]   Delivery address verified with patient/caregiver? Yes   Delivery address Home   Number of medications in delivery 1   Medication(s) being filled and delivered Galcanezumab-Gnlm   Doses left of specialty medications N/A   Copay verified? Yes   Copay amount Emgality =$0   Copay form of payment No copayment ($0)   Ship Date 08/20   Delivery Date 08/21   Signature Required No              Medication Adherence    Adherence tools used: directed education  Support network for adherence: healthcare provider          Follow-up: 28 day(s)     Binh Ch  Specialty Pharmacy Technician

## 2024-09-06 ENCOUNTER — SPECIALTY PHARMACY (OUTPATIENT)
Dept: ONCOLOGY | Facility: HOSPITAL | Age: 59
End: 2024-09-06
Payer: COMMERCIAL

## 2024-09-06 NOTE — PROGRESS NOTES
Specialty Pharmacy Patient Management Program  Neurology Reassessment     Ginette Georges is a 58 y.o. female with migraines seen by a Neurology provider and enrolled in the Neurology Patient Management program offered by [facility].  A follow-up outreach was conducted, including assessment of continued therapy appropriateness, medication adherence, and side effect incidence and management for Emgality.     Changes to Insurance Coverage or Financial Support  No changes     Relevant Past Medical History and Comorbidities  Relevant medical history and concomitant health conditions were discussed with the patient. The patient's chart has been reviewed for relevant past medical history and comorbid health conditions and updated as necessary.   Past Medical History:   Diagnosis Date    Anemia     Anxiety     Arthritis     Depression     Migraine     Sleep apnea 2013?     Social History     Socioeconomic History    Marital status:    Tobacco Use    Smoking status: Never     Passive exposure: Never    Smokeless tobacco: Never   Vaping Use    Vaping status: Never Used   Substance and Sexual Activity    Alcohol use: Yes     Alcohol/week: 2.0 standard drinks of alcohol     Types: 2 Glasses of wine per week    Drug use: Never    Sexual activity: Not Currently     Partners: Male     Birth control/protection: Post-menopausal     Problem list reviewed by Joyce Clemens, PharmD on 9/6/2024 at 10:07 AM    Hospitalizations and Urgent Care Since Last Assessment  ED Visits, Admissions, or Hospitalizations: none   Urgent Office Visits: none     Allergies  Known allergies and reactions were discussed with the patient. The patient's chart has been reviewed for allergy information and updated as necessary.   Allergies   Allergen Reactions    Atovaquone-Proguanil Hcl GI Intolerance and Unknown - Low Severity     Malarone    Penicillin G Unknown - High Severity    Penicillins      Allergies reviewed by  Sarath, Joyce L, PharmD on 9/6/2024 at 10:07 AM    Relevant Laboratory Values      Lab Assessment  N/A    Current Medication List  This medication list has been reviewed with the patient and evaluated for any interactions or necessary modifications/recommendations, and updated to include all prescription medications, OTC medications, and supplements the patient is currently taking.  This list reflects what is contained in the patient's profile, which has also been marked as reviewed to communicate to other providers it is the most up to date version of the patient's current medication therapy.     Current Outpatient Medications:     Acetaminophen (TYLENOL ARTHRITIS EXT RELIEF PO), Take 1 tablet by mouth Daily., Disp: , Rfl:     albuterol sulfate  (90 Base) MCG/ACT inhaler, ProAir HFA 90 mcg/actuation aerosol inhaler  INHALE ONE TO TWO PUFFS BY MOUTH EVERY 4 TO 6 HOURS AS NEEDED FOR SHORTNESS OF AIR, Disp: , Rfl:     FAMOTIDINE-CA CARB-MAG HYDROX PO, , Disp: , Rfl:     galcanezumab-gnlm (EMGALITY) 120 MG/ML auto-injector pen, Inject 1 mL under the skin into the appropriate area as directed Every 28 (Twenty-Eight) Days., Disp: 1 mL, Rfl: 11    linaclotide (LINZESS) 290 MCG capsule capsule, Daily., Disp: , Rfl:     lisinopril (PRINIVIL,ZESTRIL) 20 MG tablet, Take 1 tablet by mouth Daily., Disp: , Rfl:     Naproxen Sodium (ALEVE PO), Aleve  As needed, Disp: , Rfl:     pantoprazole (PROTONIX) 40 MG EC tablet, Take 1 tablet by mouth Every 12 (Twelve) Hours., Disp: , Rfl:     SUMAtriptan (IMITREX) 100 MG tablet, TAKE 1 TABLET AT ONSET OF  HEADACHE. MAY REPEAT DOSE  ONE TIME IN 2 HOURS IF     HEADACHE NOT RELIEVED, Disp: 27 tablet, Rfl: 5    Medicines reviewed by Joyce Clemens, PharmD on 9/6/2024 at 10:07 AM    Drug Interactions  No relevant drug-drug interactions with specialty medication(s):  Emgality.        Adverse Drug Reactions  Medication tolerability: Tolerating with no to minimal ADRs          Medication  plan: Continue therapy with normal follow-up  Plan for ADR Management: not required      Adherence, Self-Administration, and Current Therapy Problems  Adherence related patient's specialty therapy was discussed with the patient. The Adherence segment of this outreach has been reviewed and updated.   Adherence Questions  Linked Medication(s) Assessed: Galcanezumab-Gnlm  On average, how many doses/injections does the patient miss per month?: 0  What are the identified reasons for non-adherence or missed doses? : no problems identified  What is the estimated medication adherence level?: %  Based on the patient/caregiver response and refill history, does this patient require an MTP to track adherence improvements?: no    Additional Barriers to Patient Self-Administration: none  Methods for Supporting Patient Self-Administration: Pt adept with Emgality self-injections.    Recently Close Medication Therapy Problems  No medication therapy recommendations to display  Open Medication Therapy Problems  No medication therapy recommendations to display     Goals of Therapy  Goals related to the patient's specialty therapy was discussed with the patient. The Patient Goals segment of this outreach has been reviewed and updated.    Goals Addressed Today        Specialty Pharmacy General Goal      Decrease frequency, severity and duration of migraine attacks.  9/19/23  provider notes:  Frequency decreased two - three a month.  Last for up to 1 hours. Imitrex aborts in an hour using 2 - 3 a month,.                           Quality of Life Assessment   Quality of Life related to the patient's enrollment in the patient management program and services provided was discussed with the patient. The QOL segment of this outreach has been reviewed and updated.   Quality of Life Improvement Scale: 8-Moderately better    Reassessment Plan & Follow-Up  Medication Therapy Changes: Continue Emgality 120mg subq monthly  Related Plans,  Therapy Recommendations, or Issues to Be Addressed: none  Pharmacist to perform regular reassessments no more than (6) months from the previous assessment.  Care Coordinator to set up future refill outreaches, coordinate prescription delivery, and escalate clinical questions to pharmacist.     Attestation  Therapeutic appropriateness: Appropriate  I attest the patient was actively involved in and has agreed to the above plan of care. If the prescribed therapy is at any point deemed not appropriate based on the current or future assessments, a consultation will be initiated with the patient's specialty care provider to determine the best course of action. The revised plan of therapy will be documented along with any additional patient education provided. Discussed aforementioned material with patient via telemedicine.    Joyce Clemens, PharmD, [facility]  Clinic Specialty Pharmacist, Neurology  9/6/2024  10:15 EDT

## 2024-09-06 NOTE — PROGRESS NOTES
Specialty Pharmacy Patient Management Program  [facility]      Ginette is a 58 y.o. female contacted today regarding refills of her medication(s).    Specialty medication(s) and dose(s) confirmed: Emgality - next dose due on 9/15/24.  Other medications being refilled: none    Refill Questions      Flowsheet Row Most Recent Value   Changes to allergies? No   Changes to medications? No   New conditions or infections since last clinic visit No   Unplanned office visit, urgent care, ED, or hospital admission in the last 4 weeks  No   How does patient/caregiver feel medication is working? Good   Financial problems or insurance changes  No   If yes, describe changes in insurance or financial issues. N/A   Since the previous refill, were any specialty medication doses or scheduled injections missed or delayed?  No   If yes, please provide the amount N/A   Why were doses missed? N/A   Does this patient require a clinical escalation to a pharmacist? No            Delivery Questions      Flowsheet Row Most Recent Value   Delivery method UPS   Delivery address verified with patient/caregiver? Yes   Delivery address Home   Number of medications in delivery 1   Medication(s) being filled and delivered Galcanezumab-Gnlm   Doses left of specialty medications N/A   Copay verified? Yes   Copay amount Emgality =$0   Copay form of payment No copayment ($0)   Ship Date 9/9   Delivery Date 9/10   Signature Required No            Medication Adherence    Adherence tools used: directed education  Support network for adherence: healthcare provider            Follow-up: 25 days     Joyce Clemens, PharmD  Specialty Pharmacist  9/6/2024  10:14 EDT

## 2024-10-08 ENCOUNTER — SPECIALTY PHARMACY (OUTPATIENT)
Dept: ONCOLOGY | Facility: HOSPITAL | Age: 59
End: 2024-10-08
Payer: COMMERCIAL

## 2024-10-08 NOTE — PROGRESS NOTES
Specialty Pharmacy Refill Coordination Note     Ginette is a 58 y.o. female contacted today regarding refills of Emgality specialty medication(s).Patient is due for injection on 10/15.    Reviewed and verified with patient:       Specialty medication(s) and dose(s) confirmed: yes    Refill Questions      Flowsheet Row Most Recent Value   Changes to allergies? No   Changes to medications? No   New conditions or infections since last clinic visit No   Unplanned office visit, urgent care, ED, or hospital admission in the last 4 weeks  No   How does patient/caregiver feel medication is working? Good   Financial problems or insurance changes  No   If yes, describe changes in insurance or financial issues. N/A   Since the previous refill, were any specialty medication doses or scheduled injections missed or delayed?  No   If yes, please provide the amount N/A   Why were doses missed? N/A   Does this patient require a clinical escalation to a pharmacist? No            Delivery Questions      Flowsheet Row Most Recent Value   Delivery method UPS   Delivery address verified with patient/caregiver? Yes   Delivery address Home   Number of medications in delivery 1   Medication(s) being filled and delivered Galcanezumab-Gnlm   Doses left of specialty medications N/A   Copay verified? Yes   Copay amount Emgality =$0   Copay form of payment No copayment ($0)   Ship Date 10/09   Delivery Date 10/10   Signature Required No              Medication Adherence    Adherence tools used: directed education  Support network for adherence: healthcare provider          Follow-up: 28 day(s)     Binh Ch  Specialty Pharmacy Technician

## 2024-11-04 ENCOUNTER — SPECIALTY PHARMACY (OUTPATIENT)
Dept: ONCOLOGY | Facility: HOSPITAL | Age: 59
End: 2024-11-04
Payer: COMMERCIAL

## 2024-11-05 ENCOUNTER — SPECIALTY PHARMACY (OUTPATIENT)
Dept: ONCOLOGY | Facility: HOSPITAL | Age: 59
End: 2024-11-05
Payer: COMMERCIAL

## 2024-11-05 NOTE — PROGRESS NOTES
Specialty Pharmacy Refill Coordination Note     Ginette is a 58 y.o. female contacted today regarding refills of Emgality specialty medication(s).Patient is due for injection on 11/13.    Reviewed and verified with patient:       Specialty medication(s) and dose(s) confirmed: yes    Refill Questions      Flowsheet Row Most Recent Value   Changes to allergies? No   Changes to medications? No   New conditions or infections since last clinic visit No   Unplanned office visit, urgent care, ED, or hospital admission in the last 4 weeks  No   How does patient/caregiver feel medication is working? Good   Financial problems or insurance changes  No   If yes, describe changes in insurance or financial issues. N/A   Since the previous refill, were any specialty medication doses or scheduled injections missed or delayed?  No   If yes, please provide the amount N/A   Why were doses missed? N/A   Does this patient require a clinical escalation to a pharmacist? No            Delivery Questions      Flowsheet Row Most Recent Value   Delivery method UPS   Delivery address verified with patient/caregiver? Yes   Delivery address Home   Number of medications in delivery 1   Medication(s) being filled and delivered Galcanezumab-gnlm (EMGALITY)   Doses left of specialty medications N/A   Copay verified? Yes   Copay amount Emgality =$0   Copay form of payment No copayment ($0)   Ship Date 11/11 going out of town   Delivery Date 11/12   Signature Required No              Medication Adherence    Adherence tools used: directed education  Support network for adherence: healthcare provider          Follow-up: 28 day(s)     Binh Ch  Specialty Pharmacy Technician

## 2024-12-06 ENCOUNTER — SPECIALTY PHARMACY (OUTPATIENT)
Dept: ONCOLOGY | Facility: HOSPITAL | Age: 59
End: 2024-12-06
Payer: COMMERCIAL

## 2024-12-06 NOTE — PROGRESS NOTES
Specialty Pharmacy Refill Coordination Note     Ginette is a 58 y.o. female contacted today regarding refills of Emgality specialty medication(s).Patient is due for injection on 12/15.    Reviewed and verified with patient:       Specialty medication(s) and dose(s) confirmed: yes    Refill Questions      Flowsheet Row Most Recent Value   Changes to allergies? No   Changes to medications? No   New conditions or infections since last clinic visit No   Unplanned office visit, urgent care, ED, or hospital admission in the last 4 weeks  No   How does patient/caregiver feel medication is working? Good   Financial problems or insurance changes  No   If yes, describe changes in insurance or financial issues. N/A   Since the previous refill, were any specialty medication doses or scheduled injections missed or delayed?  No   If yes, please provide the amount N/A   Why were doses missed? N/A   Does this patient require a clinical escalation to a pharmacist? No            Delivery Questions      Flowsheet Row Most Recent Value   Delivery method UPS   Delivery address verified with patient/caregiver? Yes   Delivery address Home   Number of medications in delivery 1   Medication(s) being filled and delivered Galcanezumab-gnlm (EMGALITY)   Doses left of specialty medications N/A   Copay verified? Yes   Copay amount Emgality =$0   Copay form of payment No copayment ($0)   Ship Date 12/10   Delivery Date 12/11   Signature Required No              Medication Adherence    Adherence tools used: directed education  Support network for adherence: healthcare provider          Follow-up: 28 day(s)     Binh Ch  Specialty Pharmacy Technician

## 2024-12-11 ENCOUNTER — OFFICE VISIT (OUTPATIENT)
Dept: NEUROLOGY | Facility: CLINIC | Age: 59
End: 2024-12-11
Payer: COMMERCIAL

## 2024-12-11 VITALS
BODY MASS INDEX: 33.29 KG/M2 | HEIGHT: 64 IN | DIASTOLIC BLOOD PRESSURE: 80 MMHG | OXYGEN SATURATION: 97 % | HEART RATE: 70 BPM | WEIGHT: 195 LBS | SYSTOLIC BLOOD PRESSURE: 126 MMHG

## 2024-12-11 DIAGNOSIS — G47.33 OSA (OBSTRUCTIVE SLEEP APNEA): Chronic | ICD-10-CM

## 2024-12-11 DIAGNOSIS — G43.C0 PERIODIC HEADACHE SYNDROME, NOT INTRACTABLE: Primary | Chronic | ICD-10-CM

## 2024-12-11 PROCEDURE — 99214 OFFICE O/P EST MOD 30 MIN: CPT | Performed by: PSYCHIATRY & NEUROLOGY

## 2024-12-11 RX ORDER — SEMAGLUTIDE 1.7 MG/.75ML
INJECTION, SOLUTION SUBCUTANEOUS
COMMUNITY
Start: 2024-11-23

## 2024-12-11 RX ORDER — SUMATRIPTAN SUCCINATE 100 MG/1
TABLET ORAL
Qty: 27 TABLET | Refills: 5 | Status: SHIPPED | OUTPATIENT
Start: 2024-12-11

## 2024-12-11 RX ORDER — TOPIRAMATE 200 MG/1
TABLET, FILM COATED ORAL EVERY 12 HOURS SCHEDULED
COMMUNITY
End: 2024-12-11

## 2024-12-11 RX ORDER — LIDOCAINE HYDROCHLORIDE 20 MG/ML
JELLY TOPICAL
COMMUNITY
Start: 2024-11-20

## 2024-12-11 NOTE — PROGRESS NOTES
"Chief Complaint  Sleep Apnea and Headache    Subjective        Ginette Georges presents to [facility] NEUROLOGY  History of Present Illness    58 y.o. female returns in follow up.  Last visit on 9/19/23 continued CPAP, Emgality, sumatriptan.     ASHISH     No new or worsening sx.   Supplier is [employer]     Uses nasal pillows.       Compliant with CPAP and receiving benefit.       Uses a travel machine on most nights.      AHI 7.3      AHI 26.7 treated with CPAP 11 cm H20.       Migraines     Frequency decreased 1 - 2  a month.  Last for up to 1 hours. Imitrex aborts in an hour.       Starts in forehead and/or OD radiates to vertex pressure quality.  Mild to moderate intensity.  Can lose sight in right eye.        Preventatives: TPM-word finding difficulties, TCAD, Beta blockers, CGRP-Aimovig  Abortives: Sumatriptan, Tylenol, Excedrin, Imitrex        Objective   Vital Signs:  /80   Pulse 70   Ht 162.6 cm (64.02\")   Wt 88.5 kg (195 lb)   SpO2 97%   BMI 33.45 kg/m²   Estimated body mass index is 33.45 kg/m² as calculated from the following:    Height as of this encounter: 162.6 cm (64.02\").    Weight as of this encounter: 88.5 kg (195 lb).        Neurological Exam  Mental Status  Awake, alert and oriented to person, place and time. Oriented to person, place and time. Speech is normal. Language is fluent with no aphasia. Attention and concentration are normal. Fund of knowledge is appropriate for level of education.    Cranial Nerves  CN III, IV, VI: Extraocular movements intact bilaterally. Pupils equal round and reactive to light bilaterally.  CN V: Facial sensation is normal.  CN VII: Full and symmetric facial movement.  CN IX, X: Palate elevates symmetrically  CN XI: Shoulder shrug strength is normal.  CN XII: Tongue midline without atrophy or fasciculations.    Motor   Strength is 5/5 throughout all four extremities.    Sensory  Sensation is intact to light touch, pinprick, vibration " and proprioception in all four extremities.    Reflexes  Deep tendon reflexes are 2+ and symmetric in all four extremities.    Coordination    Finger-to-nose, rapid alternating movements and heel-to-shin normal bilaterally without dysmetria.    Gait  Normal casual, toe, heel and tandem gait.       Physical Exam  Eyes:      Extraocular Movements: Extraocular movements intact.      Pupils: Pupils are equal, round, and reactive to light.   Neurological:      Motor: Motor strength is normal.     Coordination: Coordination is intact.      Deep Tendon Reflexes: Reflexes are normal and symmetric.   Psychiatric:         Speech: Speech normal.        Result Review :  The following data was reviewed by: Brown Cruz MD on 12/11/2024:              Assessment and Plan   Diagnoses and all orders for this visit:    1. Periodic headache syndrome, not intractable (Primary)  Assessment & Plan:  Stable on Emgality and Imitrex            Orders:  -     SUMAtriptan (IMITREX) 100 MG tablet; TAKE 1 TABLET AT ONSET OF  HEADACHE. MAY REPEAT DOSE  ONE TIME IN 2 HOURS IF     HEADACHE NOT RELIEVED  Dispense: 27 tablet; Refill: 5    2. ASHISH (obstructive sleep apnea)  Assessment & Plan:  Compliant with CPAP                Follow Up   No follow-ups on file.  Patient was given instructions and counseling regarding her condition or for health maintenance advice. Please see specific information pulled into the AVS if appropriate.

## 2025-01-08 ENCOUNTER — SPECIALTY PHARMACY (OUTPATIENT)
Dept: ONCOLOGY | Facility: HOSPITAL | Age: 60
End: 2025-01-08
Payer: COMMERCIAL

## 2025-01-08 NOTE — PROGRESS NOTES
Specialty Pharmacy Refill Coordination Note     Ginette is a 59 y.o. female contacted today regarding refills of Emgality specialty medication(s).Patient is due for injection on 01/12.    Reviewed and verified with patient:       Specialty medication(s) and dose(s) confirmed: yes    Refill Questions      Flowsheet Row Most Recent Value   Changes to allergies? No   Changes to medications? No   New conditions or infections since last clinic visit No   Unplanned office visit, urgent care, ED, or hospital admission in the last 4 weeks  No   How does patient/caregiver feel medication is working? Good   Financial problems or insurance changes  No   If yes, describe changes in insurance or financial issues. N/A   Since the previous refill, were any specialty medication doses or scheduled injections missed or delayed?  No   If yes, please provide the amount N/A   Why were doses missed? N/A   Does this patient require a clinical escalation to a pharmacist? No            Delivery Questions      Flowsheet Row Most Recent Value   Delivery method UPS   Delivery address verified with patient/caregiver? Yes   Delivery address Home   Number of medications in delivery 1   Medication(s) being filled and delivered Galcanezumab-gnlm (EMGALITY)   Doses left of specialty medications N/A   Copay verified? Yes   Copay amount Emgality =$35.00 copay   Copay form of payment Credit/debit on file   Ship Date 01/08   Delivery Date 01/09   Signature Required No              Medication Adherence    Adherence tools used: directed education  Support network for adherence: healthcare provider          Follow-up: 28 day(s)     Binh Ch  Specialty Pharmacy Technician

## 2025-02-14 ENCOUNTER — SPECIALTY PHARMACY (OUTPATIENT)
Dept: ONCOLOGY | Facility: HOSPITAL | Age: 60
End: 2025-02-14
Payer: COMMERCIAL

## 2025-02-14 NOTE — PROGRESS NOTES
Specialty Pharmacy Patient Management Program  Neurology Reassessment     Ginette Georges is a 59 y.o. female with migraines seen by a Neurology provider and enrolled in the Neurology Patient Management program offered by [facility].  A follow-up outreach was conducted, including assessment of continued therapy appropriateness, medication adherence, and side effect incidence and management for emgality 120 mg/mL.     Changes to Insurance Coverage or Financial Support  No changes;  [employer] with copay card through [employer]    Relevant Past Medical History and Comorbidities  Relevant medical history and concomitant health conditions were discussed with the patient. The patient's chart has been reviewed for relevant past medical history and comorbid health conditions and updated as necessary.   Past Medical History:   Diagnosis Date    Arthritis     Diverticulitis     Hypertension 2024    Irritable bowel syndrome with constipation     Migraine     Sleep apnea 2013?     Social History     Socioeconomic History    Marital status:    Tobacco Use    Smoking status: Never     Passive exposure: Never    Smokeless tobacco: Never   Vaping Use    Vaping status: Never Used   Substance and Sexual Activity    Alcohol use: Yes     Alcohol/week: 2.0 standard drinks of alcohol     Types: 2 Glasses of wine per week    Drug use: Never    Sexual activity: Not Currently     Partners: Male     Birth control/protection: Post-menopausal     Problem list reviewed by Chay Duarte, PharmD on 2/14/2025 at 11:29 AM    Hospitalizations and Urgent Care Since Last Assessment  ED Visits, Admissions, or Hospitalizations: none   Urgent Office Visits: none    Allergies  Known allergies and reactions were discussed with the patient. The patient's chart has been reviewed for allergy information and updated as necessary.   Allergies   Allergen Reactions    Atovaquone-Proguanil Hcl Other (See Comments), GI  Intolerance and Unknown - Low Severity     Malarone, racing heart, htn    Penicillin G Unknown - High Severity    Penicillins     Proguanil Other (See Comments)     cramps     Allergies reviewed by Chay Duarte, PharmD on 2/14/2025 at 11:13 AM    Relevant Laboratory Values      Lab Assessment        Current Medication List  This medication list has been reviewed with the patient and evaluated for any interactions or necessary modifications/recommendations, and updated to include all prescription medications, OTC medications, and supplements the patient is currently taking.  This list reflects what is contained in the patient's profile, which has also been marked as reviewed to communicate to other providers it is the most up to date version of the patient's current medication therapy.     Current Outpatient Medications:     Acetaminophen (TYLENOL ARTHRITIS EXT RELIEF PO), Take 1 tablet by mouth Daily., Disp: , Rfl:     albuterol sulfate  (90 Base) MCG/ACT inhaler, ProAir HFA 90 mcg/actuation aerosol inhaler  INHALE ONE TO TWO PUFFS BY MOUTH EVERY 4 TO 6 HOURS AS NEEDED FOR SHORTNESS OF AIR, Disp: , Rfl:     FAMOTIDINE-CA CARB-MAG HYDROX PO, , Disp: , Rfl:     galcanezumab-gnlm (EMGALITY) 120 MG/ML auto-injector pen, Inject 1 mL under the skin into the appropriate area as directed Every 28 (Twenty-Eight) Days., Disp: 1 mL, Rfl: 11    linaclotide (LINZESS) 290 MCG capsule capsule, Daily., Disp: , Rfl:     lisinopril (PRINIVIL,ZESTRIL) 20 MG tablet, Take 1 tablet by mouth Daily., Disp: , Rfl:     pantoprazole (PROTONIX) 40 MG EC tablet, Take 1 tablet by mouth Every 12 (Twelve) Hours., Disp: , Rfl:     SUMAtriptan (IMITREX) 100 MG tablet, TAKE 1 TABLET AT ONSET OF  HEADACHE. MAY REPEAT DOSE  ONE TIME IN 2 HOURS IF     HEADACHE NOT RELIEVED, Disp: 27 tablet, Rfl: 5    Wegovy 1.7 MG/0.75ML solution auto-injector, Pt currently on 1.0 12.11.24, Disp: , Rfl:     Medicines reviewed by Chay Duarte,  PharmD on 2/14/2025 at 11:15 AM    Drug Interactions  No relevant drug drug interactions with specialty medication.       Adverse Drug Reactions  Medication tolerability: Tolerating with no to minimal ADRs          Medication plan: Continue therapy with normal follow-up  Plan for ADR Management: patient to report      Adherence, Self-Administration, and Current Therapy Problems  Adherence related patient's specialty therapy was discussed with the patient. The Adherence segment of this outreach has been reviewed and updated.   Adherence Questions  Linked Medication(s) Assessed: Galcanezumab-gnlm (EMGALITY)  On average, how many doses/injections does the patient miss per month?: 0  What are the identified reasons for non-adherence or missed doses? : no problems identified  What is the estimated medication adherence level?: %  Based on the patient/caregiver response and refill history, does this patient require an MTP to track adherence improvements?: no    Additional Barriers to Patient Self-Administration: none  Methods for Supporting Patient Self-Administration: n/a    Recently Close Medication Therapy Problems  No medication therapy recommendations to display  Open Medication Therapy Problems  No medication therapy recommendations to display     Goals of Therapy  Goals related to the patient's specialty therapy was discussed with the patient. The Patient Goals segment of this outreach has been reviewed and updated.    Goals Addressed Today        Specialty Pharmacy General Goal      Decrease frequency, severity and duration of migraine attacks.  9/19/23  provider notes:  Frequency decreased two - three a month.  Last for up to 1 hours. Imitrex aborts in an hour using 2 - 3 a month,.         On Average, Reduce:   Frequency of migraines to < 8 per month.   Symptom severity by 50% within 2 hours of taking acute therapy. (Sumatriptan)  Duration of migraines to < 4 hours.     Baseline Values/Notes on  Enrollment  Frequency:  2x/week  Symptom Severity: severe  Duration: up to 24 hours.    Date of Reassessment Notes on Progress Toward Above Goals   02/14/2025  1-2 migraine/month; less severe, sumatriptan reduces significantly in 2 hours                                                   Quality of Life Assessment   Quality of Life related to the patient's enrollment in the patient management program and services provided was discussed with the patient. The QOL segment of this outreach has been reviewed and updated.   Quality of Life Improvement Scale: 9-A good deal better    Reassessment Plan & Follow-Up  Medication Therapy Changes: n/a  Related Plans, Therapy Recommendations, or Issues to Be Addressed: n/a  Pharmacist to perform regular reassessments no more than (6) months from the previous assessment.  Care Coordinator to set up future refill outreaches, coordinate prescription delivery, and escalate clinical questions to pharmacist.     Attestation  Therapeutic appropriateness: Appropriate  I attest the patient was actively involved in and has agreed to the above plan of care. If the prescribed therapy is at any point deemed not appropriate based on the current or future assessments, a consultation will be initiated with the patient's specialty care provider to determine the best course of action. The revised plan of therapy will be documented along with any additional patient education provided. Discussed aforementioned material with patient by phone.    Chay Duarte, PharmD, [facility]  Clinic Specialty Pharmacist, Neurology  2/14/2025  11:47 EST

## 2025-02-14 NOTE — PROGRESS NOTES
Specialty Pharmacy Refill Coordination Note     iGnette is a 59 y.o. female contacted today regarding refills of her specialty medication(s).    Specialty medication(s) and dose(s) confirmed: emgality 120 mg/mL  Changes to medications: no  Changes to insurance: no  Reviewed and verified with patient:  Allergies  Meds  Problems         Refill Questions      Flowsheet Row Most Recent Value   Changes to allergies? No   Changes to medications? No   New conditions or infections since last clinic visit No   Unplanned office visit, urgent care, ED, or hospital admission in the last 4 weeks  No   How does patient/caregiver feel medication is working? Very good   Financial problems or insurance changes  No   Since the previous refill, were any specialty medication doses or scheduled injections missed or delayed?  No   Does this patient require a clinical escalation to a pharmacist? No            Delivery Questions      Flowsheet Row Most Recent Value   Delivery method UPS   Delivery address verified with patient/caregiver? Yes   Delivery address Home   Number of medications in delivery 1   Medication(s) being filled and delivered Galcanezumab-gnlm (EMGALITY)  [don't send next week she is out of town]   Doses left of specialty medications one for tomorrow ,  due on 15th.   Copay verified? Yes   Copay amount 35$   Copay form of payment Credit/debit on file   Ship Date 2/24   Delivery Date Selection 02/25/25   Signature Required No            Medication Adherence    Adherence tools used: directed education  Support network for adherence: healthcare provider          Follow-up: 4 week(s)     Chay Duarte, PharmD  2/14/2025   11:47 EST

## 2025-04-02 ENCOUNTER — SPECIALTY PHARMACY (OUTPATIENT)
Dept: ONCOLOGY | Facility: HOSPITAL | Age: 60
End: 2025-04-02
Payer: COMMERCIAL

## 2025-04-02 NOTE — PROGRESS NOTES
Specialty Pharmacy Patient Management Program  Refill Outreach     Ginette was contacted today regarding refills of their medication(s).    Refill Questions      Flowsheet Row Most Recent Value   Changes to allergies? No   Changes to medications? No   New conditions or infections since last clinic visit No   Unplanned office visit, urgent care, ED, or hospital admission in the last 4 weeks  No   How does patient/caregiver feel medication is working? Good   Financial problems or insurance changes  No   If yes, describe changes in insurance or financial issues. N/A   Since the previous refill, were any specialty medication doses or scheduled injections missed or delayed?  Yes   If yes, please provide the amount N/A   Why were doses missed? N/A   Does this patient require a clinical escalation to a pharmacist? No            Delivery Questions      Flowsheet Row Most Recent Value   Delivery method UPS   Delivery address verified with patient/caregiver? Yes   Delivery address Home   Number of medications in delivery 1   Medication(s) being filled and delivered Galcanezumab-gnlm (EMGALITY)   Doses left of specialty medications N/A   Copay verified? Yes   Copay amount Emgality =$35.00 copay   Copay form of payment Credit/debit on file   Delivery Date Selection 04/08/25   Signature Required No   Do you consent to receive electronic handouts?  Yes            Medication Adherence    Adherence tools used: directed education  Support network for adherence: healthcare provider          Follow-up: 28 day(s)     Binh Ch  4/2/2025  11:38 EDT

## 2025-04-30 ENCOUNTER — SPECIALTY PHARMACY (OUTPATIENT)
Dept: ONCOLOGY | Facility: HOSPITAL | Age: 60
End: 2025-04-30
Payer: COMMERCIAL

## 2025-04-30 NOTE — PROGRESS NOTES
Specialty Pharmacy Patient Management Program  Refill Outreach     Ginette was contacted today regarding refills of their medication(s).    Refill Questions      Flowsheet Row Most Recent Value   Changes to allergies? No   Changes to medications? No   New conditions or infections since last clinic visit No   Unplanned office visit, urgent care, ED, or hospital admission in the last 4 weeks  No   How does patient/caregiver feel medication is working? Good   Financial problems or insurance changes  No   If yes, describe changes in insurance or financial issues. N/A   Since the previous refill, were any specialty medication doses or scheduled injections missed or delayed?  No   If yes, please provide the amount N/A   Why were doses missed? N/A   Does this patient require a clinical escalation to a pharmacist? No            Delivery Questions      Flowsheet Row Most Recent Value   Delivery method UPS   Delivery address verified with patient/caregiver? Yes   Delivery address Home   Other address preferred N/A   Number of medications in delivery 1   Medication(s) being filled and delivered Galcanezumab-gnlm (EMGALITY)   Doses left of specialty medications N/A   Copay verified? Yes   Copay amount Emgality =$0   Copay form of payment No copayment ($0)   Delivery Date Selection 05/01/25   Signature Required No   Do you consent to receive electronic handouts?  Yes            Medication Adherence    Adherence tools used: directed education  Support network for adherence: healthcare provider          Follow-up: 28 day(s)     Binh Ch  4/30/2025  13:13 EDT

## 2025-06-04 ENCOUNTER — SPECIALTY PHARMACY (OUTPATIENT)
Dept: NEUROLOGY | Facility: CLINIC | Age: 60
End: 2025-06-04
Payer: COMMERCIAL

## 2025-06-04 NOTE — PROGRESS NOTES
Specialty Pharmacy Patient Management Program  Refill Outreach     Ginette was contacted today regarding refills of their medication(s).    Refill Questions      Flowsheet Row Most Recent Value   Changes to allergies? No   Changes to medications? No   New conditions or infections since last clinic visit No   Unplanned office visit, urgent care, ED, or hospital admission in the last 4 weeks  No   How does patient/caregiver feel medication is working? Good   Financial problems or insurance changes  No   If yes, describe changes in insurance or financial issues. n/a   Since the previous refill, were any specialty medication doses or scheduled injections missed or delayed?  No   If yes, please provide the amount n/a   Why were doses missed? n/a   Does this patient require a clinical escalation to a pharmacist? No            Delivery Questions      Flowsheet Row Most Recent Value   Delivery method UPS   Delivery address verified with patient/caregiver? Yes   Delivery address Home   Other address preferred n/a   Number of medications in delivery 1   Medication(s) being filled and delivered Galcanezumab-gnlm (EMGALITY)   Doses left of specialty medications n/a   Copay verified? Yes   Copay amount Emgality = $0   Copay form of payment No copayment ($0)   Delivery Date Selection 06/05/25   Signature Required No   Do you consent to receive electronic handouts?  Yes            Medication Adherence    Adherence tools used: directed education  Support network for adherence: healthcare provider          Follow-up: 28 day(s)     Reza Rodriguez  6/4/2025  10:58 EDT

## 2025-06-27 ENCOUNTER — SPECIALTY PHARMACY (OUTPATIENT)
Dept: ONCOLOGY | Facility: HOSPITAL | Age: 60
End: 2025-06-27
Payer: COMMERCIAL

## 2025-06-27 NOTE — PROGRESS NOTES
Specialty Pharmacy Patient Management Program  Refill Outreach     Ginette was contacted today regarding refills of their medication(s).    Refill Questions      Flowsheet Row Most Recent Value   Changes to allergies? No   Changes to medications? No   New conditions or infections since last clinic visit No   Unplanned office visit, urgent care, ED, or hospital admission in the last 4 weeks  No   How does patient/caregiver feel medication is working? Good   Financial problems or insurance changes  No   If yes, describe changes in insurance or financial issues. N/A   Since the previous refill, were any specialty medication doses or scheduled injections missed or delayed?  No   If yes, please provide the amount N/A   Why were doses missed? N/A   Does this patient require a clinical escalation to a pharmacist? No            Delivery Questions      Flowsheet Row Most Recent Value   Delivery method UPS   Delivery address verified with patient/caregiver? Yes   Delivery address Home   Other address preferred N/A   Number of medications in delivery 1   Medication(s) being filled and delivered Galcanezumab-gnlm (EMGALITY)   Doses left of specialty medications N/A   Copay verified? Yes   Copay amount Emgality =$0   Copay form of payment No copayment ($0)   Delivery Date Selection 07/01/25   Signature Required No   Do you consent to receive electronic handouts?  Yes            Medication Adherence    Adherence tools used: directed education  Support network for adherence: healthcare provider          Follow-up: 28 day(s)     Binh Ch  6/27/2025  13:24 EDT

## 2025-07-22 ENCOUNTER — SPECIALTY PHARMACY (OUTPATIENT)
Dept: ONCOLOGY | Facility: HOSPITAL | Age: 60
End: 2025-07-22
Payer: COMMERCIAL

## 2025-07-22 NOTE — PROGRESS NOTES
Specialty Pharmacy Patient Management Program  Refill Outreach     Ginette was contacted today regarding refills of their medication(s).    Refill Questions      Flowsheet Row Most Recent Value   Changes to allergies? No   Changes to medications? No   New conditions or infections since last clinic visit No   Unplanned office visit, urgent care, ED, or hospital admission in the last 4 weeks  No   How does patient/caregiver feel medication is working? Good   Financial problems or insurance changes  No   If yes, describe changes in insurance or financial issues. n/a   Since the previous refill, were any specialty medication doses or scheduled injections missed or delayed?  No   If yes, please provide the amount n/a   Why were doses missed? n/a   Does this patient require a clinical escalation to a pharmacist? No            Delivery Questions      Flowsheet Row Most Recent Value   Delivery method UPS   Delivery address verified with patient/caregiver? Yes   Delivery address Home   Other address preferred n/a   Number of medications in delivery 1   Medication(s) being filled and delivered Galcanezumab-gnlm (EMGALITY)   Doses left of specialty medications Emgaltiy = 0 injections   Copay verified? Yes   Copay amount no copay   Copay form of payment No copayment ($0)   Delivery Date Selection 07/24/25   Signature Required No   Do you consent to receive electronic handouts?  Yes            Medication Adherence    Adherence tools used: directed education  Support network for adherence: healthcare provider          Follow-up: 30 day(s)     Remy Gonzalez, Pharmacy Technician  7/22/2025  12:30 EDT

## 2025-08-12 ENCOUNTER — SPECIALTY PHARMACY (OUTPATIENT)
Dept: ONCOLOGY | Facility: HOSPITAL | Age: 60
End: 2025-08-12
Payer: COMMERCIAL

## 2025-08-12 DIAGNOSIS — G43.C0 PERIODIC HEADACHE SYNDROME, NOT INTRACTABLE: ICD-10-CM

## 2025-08-12 RX ORDER — LINACLOTIDE 145 UG/1
1 CAPSULE, GELATIN COATED ORAL DAILY
COMMUNITY
Start: 2025-06-26